# Patient Record
Sex: MALE | Race: BLACK OR AFRICAN AMERICAN | URBAN - METROPOLITAN AREA
[De-identification: names, ages, dates, MRNs, and addresses within clinical notes are randomized per-mention and may not be internally consistent; named-entity substitution may affect disease eponyms.]

---

## 2018-06-14 PROBLEM — I10 ESSENTIAL HYPERTENSION, BENIGN: Status: ACTIVE | Noted: 2018-06-14

## 2018-06-14 PROBLEM — Z00.00 ROUTINE GENERAL MEDICAL EXAMINATION AT A HEALTH CARE FACILITY: Status: ACTIVE | Noted: 2018-06-14

## 2018-06-14 PROBLEM — Z11.59 ENCOUNTER FOR HEPATITIS C SCREENING TEST FOR LOW RISK PATIENT: Status: ACTIVE | Noted: 2018-06-14

## 2018-06-29 PROBLEM — R73.9 HYPERGLYCEMIA: Status: ACTIVE | Noted: 2018-06-29

## 2018-09-17 PROBLEM — Z00.00 ROUTINE GENERAL MEDICAL EXAMINATION AT A HEALTH CARE FACILITY: Status: RESOLVED | Noted: 2018-06-14 | Resolved: 2018-09-17

## 2019-01-22 PROBLEM — E88.819 INSULIN RESISTANCE: Status: ACTIVE | Noted: 2019-01-22

## 2019-01-23 PROBLEM — E88.819 INSULIN RESISTANCE: Status: RESOLVED | Noted: 2019-01-22 | Resolved: 2019-01-23

## 2019-07-09 PROBLEM — E78.5 HYPERLIPIDEMIA: Status: ACTIVE | Noted: 2019-07-09

## 2019-10-14 PROBLEM — Z00.00 ANNUAL PHYSICAL EXAM: Status: RESOLVED | Noted: 2018-06-14 | Resolved: 2019-10-14

## 2021-05-30 ENCOUNTER — RECORDS - HEALTHEAST (OUTPATIENT)
Dept: ADMINISTRATIVE | Facility: CLINIC | Age: 66
End: 2021-05-30

## 2022-05-11 PROBLEM — Z11.59 ENCOUNTER FOR HEPATITIS C SCREENING TEST FOR LOW RISK PATIENT: Status: RESOLVED | Noted: 2018-06-14 | Resolved: 2022-05-11

## 2022-05-11 PROBLEM — R73.02 IGT (IMPAIRED GLUCOSE TOLERANCE): Status: ACTIVE | Noted: 2022-05-11

## 2023-10-13 ENCOUNTER — HOSPITAL ENCOUNTER (INPATIENT)
Facility: HOSPITAL | Age: 68
LOS: 5 days | Discharge: HOME | DRG: 200 | End: 2023-10-18
Attending: EMERGENCY MEDICINE | Admitting: INTERNAL MEDICINE
Payer: MEDICARE

## 2023-10-13 DIAGNOSIS — J93.83 SPONTANEOUS PNEUMOTHORAX: Primary | ICD-10-CM

## 2023-10-13 DIAGNOSIS — I25.10 CORONARY ARTERY CALCIFICATION SEEN ON CT SCAN: ICD-10-CM

## 2023-10-13 DIAGNOSIS — R07.9 CHEST PAIN: ICD-10-CM

## 2023-10-13 DIAGNOSIS — Q24.8 PERICARDIAL CYST: ICD-10-CM

## 2023-10-13 DIAGNOSIS — J93.9 PNEUMOTHORAX ON LEFT: ICD-10-CM

## 2023-10-13 PROBLEM — R73.03 PRE-DIABETES: Chronic | Status: ACTIVE | Noted: 2018-06-29

## 2023-10-13 LAB
ALBUMIN SERPL BCP-MCNC: 4.1 G/DL (ref 3.5–5.2)
ALP SERPL-CCNC: 65 U/L (ref 55–135)
ALT SERPL W/O P-5'-P-CCNC: 17 U/L (ref 10–44)
ANION GAP SERPL CALC-SCNC: 12 MMOL/L (ref 8–16)
AST SERPL-CCNC: 23 U/L (ref 10–40)
BASOPHILS # BLD AUTO: 0.05 K/UL (ref 0–0.2)
BASOPHILS NFR BLD: 0.5 % (ref 0–1.9)
BILIRUB SERPL-MCNC: 0.4 MG/DL (ref 0.1–1)
BNP SERPL-MCNC: <10 PG/ML (ref 0–99)
BUN SERPL-MCNC: 10 MG/DL (ref 8–23)
CALCIUM SERPL-MCNC: 9.2 MG/DL (ref 8.7–10.5)
CHLORIDE SERPL-SCNC: 104 MMOL/L (ref 95–110)
CO2 SERPL-SCNC: 22 MMOL/L (ref 23–29)
CREAT SERPL-MCNC: 1 MG/DL (ref 0.5–1.4)
DIFFERENTIAL METHOD: ABNORMAL
EOSINOPHIL # BLD AUTO: 0.5 K/UL (ref 0–0.5)
EOSINOPHIL NFR BLD: 4.8 % (ref 0–8)
ERYTHROCYTE [DISTWIDTH] IN BLOOD BY AUTOMATED COUNT: 13.5 % (ref 11.5–14.5)
EST. GFR  (NO RACE VARIABLE): >60 ML/MIN/1.73 M^2
GLUCOSE SERPL-MCNC: 107 MG/DL (ref 70–110)
HCT VFR BLD AUTO: 43.1 % (ref 40–54)
HCV AB SERPL QL IA: NEGATIVE
HEP C VIRUS HOLD SPECIMEN: NORMAL
HGB BLD-MCNC: 14.7 G/DL (ref 14–18)
HIV 1+2 AB+HIV1 P24 AG SERPL QL IA: NEGATIVE
IMM GRANULOCYTES # BLD AUTO: 0.02 K/UL (ref 0–0.04)
IMM GRANULOCYTES NFR BLD AUTO: 0.2 % (ref 0–0.5)
LYMPHOCYTES # BLD AUTO: 2 K/UL (ref 1–4.8)
LYMPHOCYTES NFR BLD: 21.3 % (ref 18–48)
MCH RBC QN AUTO: 32.3 PG (ref 27–31)
MCHC RBC AUTO-ENTMCNC: 34.1 G/DL (ref 32–36)
MCV RBC AUTO: 95 FL (ref 82–98)
MONOCYTES # BLD AUTO: 0.8 K/UL (ref 0.3–1)
MONOCYTES NFR BLD: 8.2 % (ref 4–15)
NEUTROPHILS # BLD AUTO: 6.1 K/UL (ref 1.8–7.7)
NEUTROPHILS NFR BLD: 65 % (ref 38–73)
NRBC BLD-RTO: 0 /100 WBC
PLATELET # BLD AUTO: 262 K/UL (ref 150–450)
PMV BLD AUTO: 8.6 FL (ref 9.2–12.9)
POTASSIUM SERPL-SCNC: 4 MMOL/L (ref 3.5–5.1)
PROT SERPL-MCNC: 7.8 G/DL (ref 6–8.4)
RBC # BLD AUTO: 4.55 M/UL (ref 4.6–6.2)
SODIUM SERPL-SCNC: 138 MMOL/L (ref 136–145)
TROPONIN I SERPL DL<=0.01 NG/ML-MCNC: 0.02 NG/ML (ref 0–0.03)
TROPONIN I SERPL DL<=0.01 NG/ML-MCNC: <0.006 NG/ML (ref 0–0.03)
WBC # BLD AUTO: 9.43 K/UL (ref 3.9–12.7)

## 2023-10-13 PROCEDURE — 63600175 PHARM REV CODE 636 W HCPCS: Performed by: EMERGENCY MEDICINE

## 2023-10-13 PROCEDURE — 25500020 PHARM REV CODE 255: Performed by: INTERNAL MEDICINE

## 2023-10-13 PROCEDURE — 93005 ELECTROCARDIOGRAM TRACING: CPT

## 2023-10-13 PROCEDURE — 32551 INSERTION OF CHEST TUBE: CPT | Mod: LT

## 2023-10-13 PROCEDURE — 87389 HIV-1 AG W/HIV-1&-2 AB AG IA: CPT | Performed by: EMERGENCY MEDICINE

## 2023-10-13 PROCEDURE — 80053 COMPREHEN METABOLIC PANEL: CPT | Performed by: PHYSICIAN ASSISTANT

## 2023-10-13 PROCEDURE — 84484 ASSAY OF TROPONIN QUANT: CPT | Mod: 91 | Performed by: INTERNAL MEDICINE

## 2023-10-13 PROCEDURE — 84484 ASSAY OF TROPONIN QUANT: CPT | Performed by: PHYSICIAN ASSISTANT

## 2023-10-13 PROCEDURE — 93010 ELECTROCARDIOGRAM REPORT: CPT | Mod: ,,, | Performed by: INTERNAL MEDICINE

## 2023-10-13 PROCEDURE — 25000003 PHARM REV CODE 250: Performed by: INTERNAL MEDICINE

## 2023-10-13 PROCEDURE — 99291 CRITICAL CARE FIRST HOUR: CPT

## 2023-10-13 PROCEDURE — 85025 COMPLETE CBC W/AUTO DIFF WBC: CPT | Performed by: PHYSICIAN ASSISTANT

## 2023-10-13 PROCEDURE — 93010 EKG 12-LEAD: ICD-10-PCS | Mod: ,,, | Performed by: INTERNAL MEDICINE

## 2023-10-13 PROCEDURE — 96374 THER/PROPH/DIAG INJ IV PUSH: CPT

## 2023-10-13 PROCEDURE — 21400001 HC TELEMETRY ROOM

## 2023-10-13 PROCEDURE — 86803 HEPATITIS C AB TEST: CPT | Performed by: EMERGENCY MEDICINE

## 2023-10-13 PROCEDURE — 83880 ASSAY OF NATRIURETIC PEPTIDE: CPT | Performed by: PHYSICIAN ASSISTANT

## 2023-10-13 RX ORDER — HYDRALAZINE HYDROCHLORIDE 20 MG/ML
10 INJECTION INTRAMUSCULAR; INTRAVENOUS EVERY 6 HOURS PRN
Status: DISCONTINUED | OUTPATIENT
Start: 2023-10-13 | End: 2023-10-18 | Stop reason: HOSPADM

## 2023-10-13 RX ORDER — TALC
6 POWDER (GRAM) TOPICAL NIGHTLY PRN
Status: DISCONTINUED | OUTPATIENT
Start: 2023-10-13 | End: 2023-10-18 | Stop reason: HOSPADM

## 2023-10-13 RX ORDER — ATORVASTATIN CALCIUM 10 MG/1
10 TABLET, FILM COATED ORAL DAILY
Status: DISCONTINUED | OUTPATIENT
Start: 2023-10-14 | End: 2023-10-18 | Stop reason: HOSPADM

## 2023-10-13 RX ORDER — IBUPROFEN 200 MG
24 TABLET ORAL
Status: DISCONTINUED | OUTPATIENT
Start: 2023-10-13 | End: 2023-10-18 | Stop reason: HOSPADM

## 2023-10-13 RX ORDER — GLUCAGON 1 MG
1 KIT INJECTION
Status: DISCONTINUED | OUTPATIENT
Start: 2023-10-13 | End: 2023-10-18 | Stop reason: HOSPADM

## 2023-10-13 RX ORDER — NALOXONE HCL 0.4 MG/ML
0.02 VIAL (ML) INJECTION
Status: DISCONTINUED | OUTPATIENT
Start: 2023-10-13 | End: 2023-10-18 | Stop reason: HOSPADM

## 2023-10-13 RX ORDER — AMOXICILLIN 250 MG
1 CAPSULE ORAL 2 TIMES DAILY
Status: DISCONTINUED | OUTPATIENT
Start: 2023-10-13 | End: 2023-10-18 | Stop reason: HOSPADM

## 2023-10-13 RX ORDER — IBUPROFEN 200 MG
16 TABLET ORAL
Status: DISCONTINUED | OUTPATIENT
Start: 2023-10-13 | End: 2023-10-18 | Stop reason: HOSPADM

## 2023-10-13 RX ORDER — IPRATROPIUM BROMIDE AND ALBUTEROL SULFATE 2.5; .5 MG/3ML; MG/3ML
3 SOLUTION RESPIRATORY (INHALATION) EVERY 6 HOURS PRN
Status: DISCONTINUED | OUTPATIENT
Start: 2023-10-13 | End: 2023-10-18 | Stop reason: HOSPADM

## 2023-10-13 RX ORDER — TRAMADOL HYDROCHLORIDE 50 MG/1
50 TABLET ORAL EVERY 6 HOURS PRN
Status: DISCONTINUED | OUTPATIENT
Start: 2023-10-13 | End: 2023-10-18 | Stop reason: HOSPADM

## 2023-10-13 RX ORDER — SODIUM CHLORIDE 0.9 % (FLUSH) 0.9 %
10 SYRINGE (ML) INJECTION EVERY 12 HOURS PRN
Status: DISCONTINUED | OUTPATIENT
Start: 2023-10-13 | End: 2023-10-18 | Stop reason: HOSPADM

## 2023-10-13 RX ORDER — VALSARTAN 160 MG/1
320 TABLET ORAL DAILY
Status: DISCONTINUED | OUTPATIENT
Start: 2023-10-14 | End: 2023-10-18 | Stop reason: HOSPADM

## 2023-10-13 RX ORDER — MORPHINE SULFATE 4 MG/ML
1 INJECTION, SOLUTION INTRAMUSCULAR; INTRAVENOUS EVERY 4 HOURS PRN
Status: DISCONTINUED | OUTPATIENT
Start: 2023-10-13 | End: 2023-10-17

## 2023-10-13 RX ORDER — MORPHINE SULFATE 4 MG/ML
4 INJECTION, SOLUTION INTRAMUSCULAR; INTRAVENOUS
Status: COMPLETED | OUTPATIENT
Start: 2023-10-13 | End: 2023-10-13

## 2023-10-13 RX ORDER — ACETAMINOPHEN 325 MG/1
650 TABLET ORAL EVERY 8 HOURS PRN
Status: DISCONTINUED | OUTPATIENT
Start: 2023-10-13 | End: 2023-10-18 | Stop reason: HOSPADM

## 2023-10-13 RX ORDER — ONDANSETRON 2 MG/ML
4 INJECTION INTRAMUSCULAR; INTRAVENOUS EVERY 6 HOURS PRN
Status: DISCONTINUED | OUTPATIENT
Start: 2023-10-13 | End: 2023-10-18 | Stop reason: HOSPADM

## 2023-10-13 RX ORDER — ACETAMINOPHEN 325 MG/1
650 TABLET ORAL EVERY 4 HOURS PRN
Status: DISCONTINUED | OUTPATIENT
Start: 2023-10-13 | End: 2023-10-18 | Stop reason: HOSPADM

## 2023-10-13 RX ORDER — POLYETHYLENE GLYCOL 3350 17 G/17G
17 POWDER, FOR SOLUTION ORAL DAILY PRN
Status: DISCONTINUED | OUTPATIENT
Start: 2023-10-13 | End: 2023-10-18 | Stop reason: HOSPADM

## 2023-10-13 RX ADMIN — IOHEXOL 100 ML: 350 INJECTION, SOLUTION INTRAVENOUS at 09:10

## 2023-10-13 RX ADMIN — TRAMADOL HYDROCHLORIDE 50 MG: 50 TABLET, COATED ORAL at 08:10

## 2023-10-13 RX ADMIN — Medication 6 MG: at 08:10

## 2023-10-13 RX ADMIN — SENNOSIDES AND DOCUSATE SODIUM 1 TABLET: 8.6; 5 TABLET ORAL at 08:10

## 2023-10-13 RX ADMIN — MORPHINE SULFATE 4 MG: 4 INJECTION INTRAVENOUS at 02:10

## 2023-10-13 NOTE — NURSING TRANSFER
Nursing Transfer Note      10/13/2023   5:43 PM    Nurse giving handoff: Brianna TODD  Nurse receiving handoff:Mireya TODD    Reason patient is being transferred: Continuous cardiac/O2 monitoring    Transfer To: 217    Transfer via stretcher    Transfer with chest tube and personal belongings    Transported by Brianna RN    Transfer Vital Signs:  Blood Pressure:141/94  Heart Rate:79  O2:95  Temperature:98.8  Respirations:20    Telemetry: Box Number [id number], Rate 79, and Rhythm NSR  Order for Tele Monitor? Yes    Additional Lines: Chest Tube left side to cont. Wall suction    4eyes on Skin: yes    Medicines sent: none    Any special needs or follow-up needed: CTS consult    Patient belongings transferred with patient: Yes    Chart send with patient: Yes    Notified: spouse, daughter    Patient reassessed at: 2413 10/13/23 (date, time)  1  Upon arrival to floor: cardiac monitor applied, patient oriented to room, call bell in reach, and bed in lowest position

## 2023-10-13 NOTE — FIRST PROVIDER EVALUATION
Medical screening examination initiated.  I have conducted a focused provider triage encounter, findings are as follows:    Brief history of present illness:  CP, sob, onset 8am today    Vitals:    10/13/23 1320   BP: 126/67   BP Location: Right arm   Patient Position: Sitting   Pulse: 95   Resp: 16   Temp: 99.1 °F (37.3 °C)   TempSrc: Oral   SpO2: 95%   Weight: 98.8 kg (217 lb 13 oz)       Pertinent physical exam:  nad, alert    Brief workup plan:  cardiac    Preliminary workup initiated; this workup will be continued and followed by the physician or advanced practice provider that is assigned to the patient when roomed.

## 2023-10-13 NOTE — SUBJECTIVE & OBJECTIVE
Past Medical History:   Diagnosis Date    Hyperlipidemia     Hypertension     Malignant tumor of prostate        Past Surgical History:   Procedure Laterality Date    PROSTATECTOMY         Review of patient's allergies indicates:  No Known Allergies    No current facility-administered medications on file prior to encounter.     Current Outpatient Medications on File Prior to Encounter   Medication Sig    atorvastatin (LIPITOR) 10 MG tablet Take 1 tablet by mouth once daily    clotrimazole-betamethasone 1-0.05% (LOTRISONE) cream APPLY DAB TO AFFECTED AREA DAILY AS NEEDED    JANUMET -1,000 mg TM24 Take 1 tablet by mouth once daily    meloxicam (MOBIC) 15 MG tablet Take 1 tablet (15 mg total) by mouth once daily.    olmesartan (BENICAR) 40 MG tablet Take 1 tablet by mouth once daily    tadalafiL (CIALIS) 20 MG Tab TAKE 1 TABLET BY MOUTH EVERY OTHER DAY AS NEEDED FOR ERECTILE DYSFUNCTION    traMADoL (ULTRAM) 50 mg tablet Take 50 mg by mouth every 4 to 6 hours as needed for Pain.     Family History       Problem Relation (Age of Onset)    Hypertension Mother          Tobacco Use    Smoking status: Never    Smokeless tobacco: Never   Substance and Sexual Activity    Alcohol use: No    Drug use: No    Sexual activity: Not Currently     Review of Systems   Constitutional:  Positive for activity change and fatigue. Negative for chills and fever.   HENT:  Negative for congestion, postnasal drip, rhinorrhea, sneezing and sore throat.    Eyes:  Negative for visual disturbance.   Respiratory:  Positive for cough (dry) and shortness of breath. Negative for chest tightness and wheezing.    Cardiovascular:  Positive for chest pain. Negative for palpitations and leg swelling.   Gastrointestinal:  Negative for abdominal distention, abdominal pain, blood in stool, constipation, diarrhea, nausea and vomiting.   Genitourinary:  Negative for difficulty urinating, hematuria and urgency.   Musculoskeletal:         R shoulder labrum  injury     Skin:  Negative for rash and wound.   Allergic/Immunologic: Negative.    Neurological:  Negative for dizziness, syncope, weakness, light-headedness and headaches.   Hematological: Negative.    Psychiatric/Behavioral: Negative.       Objective:     Vital Signs (Most Recent):  Temp: 98.8 °F (37.1 °C) (10/13/23 1545)  Pulse: 72 (10/13/23 1545)  Resp: 18 (10/13/23 1545)  BP: (!) 140/75 (10/13/23 1545)  SpO2: 98 % (10/13/23 1545) Vital Signs (24h Range):  Temp:  [98.8 °F (37.1 °C)-99.1 °F (37.3 °C)] 98.8 °F (37.1 °C)  Pulse:  [72-95] 72  Resp:  [16-20] 18  SpO2:  [95 %-98 %] 98 %  BP: (126-161)/(67-82) 140/75     Weight: 98.8 kg (217 lb 13 oz)  Body mass index is 31.25 kg/m².     Physical Exam  Vitals and nursing note reviewed.   Constitutional:       General: He is not in acute distress.     Appearance: He is obese. He is not ill-appearing.   HENT:      Head: Normocephalic and atraumatic.      Mouth/Throat:      Mouth: Mucous membranes are moist.      Pharynx: Oropharynx is clear.   Eyes:      General: No scleral icterus.     Extraocular Movements: Extraocular movements intact.      Conjunctiva/sclera: Conjunctivae normal.   Cardiovascular:      Rate and Rhythm: Normal rate and regular rhythm.      Heart sounds: Normal heart sounds. No murmur heard.     No friction rub. No gallop.   Pulmonary:      Effort: Pulmonary effort is normal.      Breath sounds: Normal breath sounds. No wheezing, rhonchi or rales.      Comments: Sats in mid 90s on room air, no increased work of breathing   Abdominal:      General: Bowel sounds are normal. There is no distension.      Palpations: Abdomen is soft.      Tenderness: There is no abdominal tenderness. There is no guarding or rebound.   Genitourinary:     Comments: Deferred  Musculoskeletal:      Right lower leg: No edema.      Left lower leg: No edema.   Skin:     General: Skin is warm and dry.      Findings: No rash.      Comments: L sided chest tube in place to wall  suction    Neurological:      General: No focal deficit present.      Mental Status: He is alert and oriented to person, place, and time. Mental status is at baseline.   Psychiatric:         Mood and Affect: Mood normal.         Behavior: Behavior normal.                Significant Labs: All pertinent labs within the past 24 hours have been reviewed.    Significant Imaging: I have reviewed all pertinent imaging results/findings within the past 24 hours.

## 2023-10-13 NOTE — PLAN OF CARE
Patient arrived to room 217. Left chest tube in place, CDI, on continuous wall suction. VSS, on RA. Cardiac and cont. Pulse ox applied. Chest tube taped to floor, emergency equipment at bedside. No c/o, family at bed side. NSR on tele box #[id number].      Problem: Adult Inpatient Plan of Care  Goal: Plan of Care Review  Outcome: Ongoing, Progressing

## 2023-10-13 NOTE — ED NOTES
"Pt's chest tube has Petrillium gauze held in place by secondary dressing of sterile 4" x 4" gauze secured by tape on 3 sides. Chest tube is hooked up to low, continuous suction.   "

## 2023-10-13 NOTE — PHARMACY MED REC
"Admission Medication History     The home medication history was taken by Prudencio Harrell.    You may go to "Admission" then "Reconcile Home Medications" tabs to review and/or act upon these items.     The home medication list has been updated by the Pharmacy department.   Please read ALL comments highlighted in yellow.   Please address this information as you see fit.    Feel free to contact us if you have any questions or require assistance.      Medications listed below were obtained from: [employer]- [employer] and Medical records  (Not in a hospital admission)        Prudencio Harrell  [id number]      Current Outpatient Medications on File Prior to Encounter   Medication Sig Dispense Refill Last Dose    atorvastatin (LIPITOR) 10 MG tablet Take 1 tablet by mouth once daily 90 tablet 0     clotrimazole-betamethasone 1-0.05% (LOTRISONE) cream APPLY DAB TO AFFECTED AREA DAILY AS NEEDED       JANUMET -1,000 mg TM24 Take 1 tablet by mouth once daily 90 tablet 0     meloxicam (MOBIC) 15 MG tablet Take 1 tablet (15 mg total) by mouth once daily. 30 tablet 1     olmesartan (BENICAR) 40 MG tablet Take 1 tablet by mouth once daily 90 tablet 0     tadalafiL (CIALIS) 20 MG Tab TAKE 1 TABLET BY MOUTH EVERY OTHER DAY AS NEEDED FOR ERECTILE DYSFUNCTION       traMADoL (ULTRAM) 50 mg tablet Take 50 mg by mouth every 4 to 6 hours as needed for Pain.                              .          "

## 2023-10-13 NOTE — ED NOTES
Dr. Fairchild at bedside prepping for chest tube insertion at this time. Consent form signed and placed in pt's chart

## 2023-10-13 NOTE — ED PROVIDER NOTES
SCRIBE #1 NOTE: I, Nereyda Chavez, am scribing for, and in the presence of, Mariola Caldwell MD. I have scribed the entire note.       History     Chief Complaint   Patient presents with    Chest Pain     Pt states hes been having chest pain that started about 0800 this morning. Pt describes his pain as a sharp pain in his left chest that does not radiate anywhere.     Review of patient's allergies indicates:  No Known Allergies      History of Present Illness     HPI    10/13/2023, 2:07 PM  History obtained from the patient      History of Present Illness: Brayan Mills is a 68 y.o. male patient with no cardio or pulmonary history who presents to the Emergency Department for evaluation of chest pain which onset when he woke up around 8 this am. Pt describes a sharp pain in the left side of his chest that does not radiate. Symptoms are constant and moderate in severity. No mitigating or exacerbating factors reported. Associated sxs include SOB. Patient denies any fever, chills, abd pain, cough, back pain, and all other sxs at this time. No prior tx reported. No further complaints or concerns at this time.       Arrival mode: Personal vehicle      PCP: Maria Ines Bingham MD        Past Medical History:  Past Medical History:   Diagnosis Date    Hyperlipidemia     Hypertension     Malignant tumor of prostate        Past Surgical History:  Past Surgical History:   Procedure Laterality Date    PROSTATECTOMY           Family History:  Family History   Problem Relation Age of Onset    Hypertension Mother        Social History:  Social History     Tobacco Use    Smoking status: Never    Smokeless tobacco: Never   Substance and Sexual Activity    Alcohol use: No    Drug use: No    Sexual activity: Not Currently        Review of Systems     Review of Systems   Constitutional:  Negative for chills and fever.   HENT:  Negative for ear discharge and ear pain.    Eyes:  Negative for pain and redness.   Respiratory:   Positive for shortness of breath. Negative for cough.    Cardiovascular:  Positive for chest pain.   Gastrointestinal:  Negative for abdominal pain and nausea.   Genitourinary:  Negative for dysuria and hematuria.   Musculoskeletal:  Negative for back pain and neck pain.   Skin:  Negative for rash and wound.   Neurological:  Negative for weakness and numbness.   Psychiatric/Behavioral:  Negative for agitation and confusion.    All other systems reviewed and are negative.       Physical Exam     Initial Vitals [10/13/23 1320]   BP Pulse Resp Temp SpO2   126/67 95 16 99.1 °F (37.3 °C) 95 %      MAP       --          Physical Exam  Nursing Notes and Vital Signs Reviewed.  Constitutional: Patient is in no acute distress. Well-developed and well-nourished.  Head: Atraumatic. Normocephalic.  Eyes: PERRL. EOM intact. Conjunctivae are not pale. No scleral icterus.  ENT: Mucous membranes are moist. Oropharynx is clear and symmetric.    Neck: Supple. Full ROM. No lymphadenopathy.  Cardiovascular: Regular rate. Regular rhythm. No murmurs, rubs, or gallops. Distal pulses are 2+ and symmetric.  Pulmonary/Chest: Decreased breath sounds on the left side. No wheezing or rales.  Abdominal: Soft and non-distended.  There is no tenderness.  No rebound, guarding, or rigidity.  Genitourinary: No CVA tenderness  Musculoskeletal: Moves all extremities. No obvious deformities. No edema. No calf tenderness.  Skin: Warm and dry.  Neurological:  Alert, awake, and appropriate.  Normal speech.  No acute focal neurological deficits are appreciated.  Psychiatric: Normal affect. Good eye contact. Appropriate in content.     ED Course   Critical Care    Date/Time: 10/13/2023 2:18 PM    Performed by: Stephon Fairchild Jr., MD  Authorized by: Stephon Fairchild Jr., MD  Direct patient critical care time: 12 minutes  Additional history critical care time: 9 minutes  Ordering / reviewing critical care time: 8 minutes  Documentation critical care time: 7  minutes  Consulting other physicians critical care time: 6 minutes  Consult with family critical care time: 5 minutes  Total critical care time (exclusive of procedural time) : 47 minutes  Critical care time was exclusive of separately billable procedures and treating other patients and teaching time.  Critical care was necessary to treat or prevent imminent or life-threatening deterioration of the following conditions: left lung collapse.  Critical care was time spent personally by me on the following activities: blood draw for specimens, development of treatment plan with patient or surrogate, discussions with consultants, evaluation of patient's response to treatment, examination of patient, obtaining history from patient or surrogate, ordering and performing treatments and interventions, ordering and review of laboratory studies, ordering and review of radiographic studies, pulse oximetry, re-evaluation of patient's condition and review of old charts.      Chest Tube    Date/Time: 10/13/2023 2:44 PM  Location procedure was performed: [facility] EMERGENCY DEPARTMENT    Performed by: Stephon Fairchild Jr., MD  Authorized by: Stephon Fairchild Jr., MD  Consent Done: Yes  Consent: Verbal consent obtained.  Indications: pneumothorax    Patient sedated: no  Anesthesia: local infiltration    Anesthesia:  Local Anesthetic: lidocaine 1% without epinephrine  Preparation: skin prepped with Betadine and sterile field established  Placement location: left lateral  Scalpel size: 11  Tube size: 8 Trinidadian  Dissection instrument: finger  Ultrasound guidance: no  Tension pneumothorax heard: no  Tube connected to: suction  Drainage characteristics: air.  Suture material: 0 silk  Dressing: 4x4 sterile gauze and Xeroform gauze  Patient tolerance: Patient tolerated the procedure well with no immediate complications        ED Vital Signs:  Vitals:    10/13/23 1418 10/13/23 1500 10/13/23 1545 10/13/23 1733   BP: 128/75 132/70 (!) 140/75 (!) 141/94  "  Pulse: 89 77 72 79   Resp: 16 20 18 20   Temp:   98.8 °F (37.1 °C) 98.8 °F (37.1 °C)   TempSrc:   Oral Oral   SpO2: 96% 97% 98% 95%   Weight:    94.6 kg (208 lb 8.9 oz)   Height:    5' 10" (1.778 m)    10/13/23 2004 10/13/23 2017 10/13/23 2057 10/14/23 0000   BP: (!) 115/59   126/63   Pulse: 79 81  68   Resp: 18 20 18 18   Temp: 97.2 °F (36.2 °C)   97.9 °F (36.6 °C)   TempSrc: Oral   Oral   SpO2: (!) 94% (!) 94%  95%   Weight:    95 kg (209 lb 7 oz)   Height:        10/14/23 0404 10/14/23 0738 10/14/23 0825 10/14/23 0844   BP: 132/76 134/64     Pulse: 69 72     Resp: 18 18     Temp: 98.4 °F (36.9 °C) 98.8 °F (37.1 °C)     TempSrc: Oral      SpO2: (!) 94% (!) 93% (!) 94% (!) 94%   Weight:       Height:        10/14/23 0930 10/14/23 1102 10/14/23 1112   BP:   119/62   Pulse: 105 72 81   Resp:   18   Temp:   99.1 °F (37.3 °C)   TempSrc:      SpO2:   (!) 93%   Weight:      Height:          Abnormal Lab Results:  Labs Reviewed   CBC W/ AUTO DIFFERENTIAL - Abnormal; Notable for the following components:       Result Value    RBC 4.55 (*)     MCH 32.3 (*)     MPV 8.6 (*)     All other components within normal limits    Narrative:     Release to patient->Immediate   COMPREHENSIVE METABOLIC PANEL - Abnormal; Notable for the following components:    CO2 22 (*)     All other components within normal limits    Narrative:     Release to patient->Immediate   TROPONIN I    Narrative:     Release to patient->Immediate   B-TYPE NATRIURETIC PEPTIDE    Narrative:     Release to patient->Immediate   HIV 1 / 2 ANTIBODY    Narrative:     Release to patient->Immediate   HEPATITIS C ANTIBODY    Narrative:     Release to patient->Immediate   HEP C VIRUS HOLD SPECIMEN    Narrative:     Release to patient->Immediate   TROPONIN I        All Lab Results:  Results for orders placed or performed during the hospital encounter of 10/13/23   CBC auto differential   Result Value Ref Range    WBC 9.43 3.90 - 12.70 K/uL    RBC 4.55 (L) 4.60 - 6.20 " M/uL    Hemoglobin 14.7 14.0 - 18.0 g/dL    Hematocrit 43.1 40.0 - 54.0 %    MCV 95 82 - 98 fL    MCH 32.3 (H) 27.0 - 31.0 pg    MCHC 34.1 32.0 - 36.0 g/dL    RDW 13.5 11.5 - 14.5 %    Platelets 262 150 - 450 K/uL    MPV 8.6 (L) 9.2 - 12.9 fL    Immature Granulocytes 0.2 0.0 - 0.5 %    Gran # (ANC) 6.1 1.8 - 7.7 K/uL    Immature Grans (Abs) 0.02 0.00 - 0.04 K/uL    Lymph # 2.0 1.0 - 4.8 K/uL    Mono # 0.8 0.3 - 1.0 K/uL    Eos # 0.5 0.0 - 0.5 K/uL    Baso # 0.05 0.00 - 0.20 K/uL    nRBC 0 0 /100 WBC    Gran % 65.0 38.0 - 73.0 %    Lymph % 21.3 18.0 - 48.0 %    Mono % 8.2 4.0 - 15.0 %    Eosinophil % 4.8 0.0 - 8.0 %    Basophil % 0.5 0.0 - 1.9 %    Differential Method Automated    Comprehensive metabolic panel   Result Value Ref Range    Sodium 138 136 - 145 mmol/L    Potassium 4.0 3.5 - 5.1 mmol/L    Chloride 104 95 - 110 mmol/L    CO2 22 (L) 23 - 29 mmol/L    Glucose 107 70 - 110 mg/dL    BUN 10 8 - 23 mg/dL    Creatinine 1.0 0.5 - 1.4 mg/dL    Calcium 9.2 8.7 - 10.5 mg/dL    Total Protein 7.8 6.0 - 8.4 g/dL    Albumin 4.1 3.5 - 5.2 g/dL    Total Bilirubin 0.4 0.1 - 1.0 mg/dL    Alkaline Phosphatase 65 55 - 135 U/L    AST 23 10 - 40 U/L    ALT 17 10 - 44 U/L    eGFR >60 >60 mL/min/1.73 m^2    Anion Gap 12 8 - 16 mmol/L   Troponin I #1   Result Value Ref Range    Troponin I 0.019 0.000 - 0.026 ng/mL   BNP   Result Value Ref Range    BNP <10 0 - 99 pg/mL   HIV 1/2 Ag/Ab (4th Gen)   Result Value Ref Range    HIV 1/2 Ag/Ab Negative Negative   Hepatitis C Antibody   Result Value Ref Range    Hepatitis C Ab Negative Negative   HCV Virus Hold Specimen   Result Value Ref Range    HEP C Virus Hold Specimen Hold for HCV sendout    Troponin I #2   Result Value Ref Range    Troponin I <0.006 0.000 - 0.026 ng/mL   Basic Metabolic Panel (BMP)   Result Value Ref Range    Sodium 139 136 - 145 mmol/L    Potassium 4.2 3.5 - 5.1 mmol/L    Chloride 103 95 - 110 mmol/L    CO2 27 23 - 29 mmol/L    Glucose 89 70 - 110 mg/dL    BUN 12 8 -  23 mg/dL    Creatinine 0.9 0.5 - 1.4 mg/dL    Calcium 8.9 8.7 - 10.5 mg/dL    Anion Gap 9 8 - 16 mmol/L    eGFR >60 >60 mL/min/1.73 m^2   CBC with Automated Differential   Result Value Ref Range    WBC 6.49 3.90 - 12.70 K/uL    RBC 4.11 (L) 4.60 - 6.20 M/uL    Hemoglobin 13.3 (L) 14.0 - 18.0 g/dL    Hematocrit 39.3 (L) 40.0 - 54.0 %    MCV 96 82 - 98 fL    MCH 32.4 (H) 27.0 - 31.0 pg    MCHC 33.8 32.0 - 36.0 g/dL    RDW 13.4 11.5 - 14.5 %    Platelets 243 150 - 450 K/uL    MPV 8.9 (L) 9.2 - 12.9 fL    Immature Granulocytes 0.3 0.0 - 0.5 %    Gran # (ANC) 4.0 1.8 - 7.7 K/uL    Immature Grans (Abs) 0.02 0.00 - 0.04 K/uL    Lymph # 1.3 1.0 - 4.8 K/uL    Mono # 0.6 0.3 - 1.0 K/uL    Eos # 0.5 0.0 - 0.5 K/uL    Baso # 0.03 0.00 - 0.20 K/uL    nRBC 0 0 /100 WBC    Gran % 62.3 38.0 - 73.0 %    Lymph % 20.3 18.0 - 48.0 %    Mono % 9.2 4.0 - 15.0 %    Eosinophil % 7.4 0.0 - 8.0 %    Basophil % 0.5 0.0 - 1.9 %    Differential Method Automated          Imaging Results:  Imaging Results              X-Ray Chest 1 View (Final result)  Result time 10/13/23 15:07:17      Final result by Teresa Jimenez MD (Timothy) (10/13/23 15:07:17)                   Impression:      Near complete re-expansion of left pneumothorax.  Residual atelectasis suspected at the left base.      Electronically signed by: Teresa Jimenez MD  Date:    10/13/2023  Time:    15:07               Narrative:    EXAMINATION:  XR CHEST 1 VIEW    CLINICAL HISTORY:  Pneumothorax,    COMPARISON:  Chest, 10/13/2023.    FINDINGS:  Heart is normal in size.  There is been near complete re-expansion of the left lung.  There appears to be residual atelectasis at the left base.                                       X-Ray Chest AP Portable (Final result)  Result time 10/13/23 13:40:14      Final result by Cricket Guillermo MD (10/13/23 13:40:14)                   Impression:      Left tension pneumothorax.    Critical findings communicated to Nilton Sunshine NP by telephone on  October 13, 2023 at 1338.      Electronically signed by: Cricket Guillermo  Date:    10/13/2023  Time:    13:40               Narrative:    EXAMINATION:  XR CHEST AP PORTABLE    CLINICAL HISTORY:  Chest Pain;    FINDINGS:  Large left pneumothorax with rightward deviation of the mediastinum.  Right lung is clear.  No pneumothorax or pleural effusion.  No acute osseous finding.                                       The EKG was ordered, reviewed, and independently interpreted by the ED provider.  Interpretation time: 13:20  Rate: 99 BPM  Rhythm: normal sinus rhythm  Interpretation: Normal ECG. No STEMI.             The Emergency Provider reviewed the vital signs and test results, which are outlined above.     ED Discussion       3:07 PM: Discussed pt's case with Erma Carrera DO (General Surgery) who recommends consulting CT surgery because she does not manage chest tubes.    3:12 PM: Discussed case with Chung Knight MD (Cardiothoracic Surgery). Dr. Knight agrees with current care and management of pt and agrees to see pt in consult.     4:15 PM: Discussed case with Mariola Caldwell MD (Hospital Medicine). Dr. Caldwell agrees with current care and management of pt and accepts admission.   Admitting Service: Hospital medicine  Admitting Physician: Dr. Caldwell  Admit to: Obs med/ tele    4:16 PM: Re-evaluated pt. I have discussed test results, shared treatment plan, and the need for admission with patient and family at bedside. Pt and family express understanding at this time and agree with all information. All questions answered. Pt and family have no further questions or concerns at this time. Pt is ready for admit.             Medical Decision Making  Amount and/or Complexity of Data Reviewed  Labs: ordered. Decision-making details documented in ED Course.  Radiology: ordered. Decision-making details documented in ED Course.  ECG/medicine tests: ordered. Decision-making details documented in ED  Course.    Risk  Prescription drug management.  Decision regarding hospitalization.                ED Medication(s):  Medications   atorvastatin tablet 10 mg (10 mg Oral Given 10/14/23 0917)   valsartan tablet 320 mg (320 mg Oral Given 10/14/23 0917)   traMADoL tablet 50 mg (50 mg Oral Given 10/13/23 2057)   sodium chloride 0.9% flush 10 mL (has no administration in time range)   naloxone 0.4 mg/mL injection 0.02 mg (has no administration in time range)   glucose chewable tablet 16 g (has no administration in time range)   glucose chewable tablet 24 g (has no administration in time range)   glucagon (human recombinant) injection 1 mg (has no administration in time range)   acetaminophen tablet 650 mg (has no administration in time range)   morphine injection 1 mg (has no administration in time range)   senna-docusate 8.6-50 mg per tablet 1 tablet (1 tablet Oral Given 10/14/23 0917)   polyethylene glycol packet 17 g (has no administration in time range)   ondansetron injection 4 mg (has no administration in time range)   albuterol-ipratropium 2.5 mg-0.5 mg/3 mL nebulizer solution 3 mL (has no administration in time range)   acetaminophen tablet 650 mg (has no administration in time range)   melatonin tablet 6 mg (6 mg Oral Given 10/13/23 2056)   dextrose 10% bolus 125 mL 125 mL (has no administration in time range)   dextrose 10% bolus 250 mL 250 mL (has no administration in time range)   hydrALAZINE injection 10 mg (has no administration in time range)   morphine injection 4 mg (4 mg Intravenous Given 10/13/23 1418)   iohexoL (OMNIPAQUE 350) injection 100 mL (100 mLs Intravenous Given 10/13/23 2138)       Current Discharge Medication List                  Scribe Attestation:   Scribe #1: I performed the above scribed service and the documentation accurately describes the services I performed. I attest to the accuracy of the note.     Attending:   Physician Attestation Statement for Scribe #1: Paulette GILBERT Riddhi S,  MD, personally performed the services described in this documentation, as scribed by Nereyda Chavez, in my presence, and it is both accurate and complete.           Clinical Impression       ICD-10-CM ICD-9-CM   1. Chest pain  R07.9 786.50   2. Pneumothorax on left  J93.9 512.89   3. Spontaneous pneumothorax  J93.83 512.89       Disposition:   Disposition: Placed in Observation  Condition: Stephon Leyva Jr., MD  10/14/23 1305

## 2023-10-13 NOTE — H&P
[facility]  [facility]  History & Physical    Patient Name: Brayan Mills  MRN: [id number]  Patient Class: IP- Inpatient  Admission Date: 10/13/2023  Attending Physician: Mariola Caldwell MD  Primary Care Provider: Maria Ines Bingham MD         Patient information was obtained from patient, relative(s) and ER records.     Subjective:     Principal Problem:Spontaneous pneumothorax    Chief Complaint:   Chief Complaint   Patient presents with    Chest Pain     Pt states hes been having chest pain that started about 0800 this morning. Pt describes his pain as a sharp pain in his left chest that does not radiate anywhere.        HPI: Pt is a 67 YO  male with PMH notable for essential HTN, HLD, pre-DM, prostate ca (s/p prostatectomy) who presented to the ED on 10/13/23 for evaluation of L sided chest pain and shortness of breath. Pt reports he was in his usual state of health until approx 7 am on morning of admission when he began feeling fatigued, short of breath and had vague left sided chest discomfort. Pt reports he took some Advil which did not help the pain. Went to physical therapy for this shoulder but became fatigued and short of breath with activity which was unusual for him. Denies any prior similar episodes. Reports associated intermittent dry cough. Per patient's daughter, his wife noticed dry cough a few days ago but more pronounced this AM. In the ED, initial VS: temp 99.1, HR 95, RR 16, /67, sats 95% on RA. Work up notable for CXR with L tension pneumothorax. Labs fairly unremarkable. EKG NSR, trop and BNP negative. L sided chest tube was placed by the ED provider, CT surgery Dr. Knight consulted for chest tube management. Subsequent CXR showed near resolution of L PTX. Hospital medicine consulted for admission.     Pt reports he is very active at baseline. Denies any recent illness, trauma, falls, straining or injuries. He is not a smoker, denies any  prior hx of COPD/asthma/emphysema. Reports CP and dyspnea improved since placement of chest tube.       Past Medical History:   Diagnosis Date    Hyperlipidemia     Hypertension     Malignant tumor of prostate        Past Surgical History:   Procedure Laterality Date    PROSTATECTOMY         Review of patient's allergies indicates:  No Known Allergies    No current facility-administered medications on file prior to encounter.     Current Outpatient Medications on File Prior to Encounter   Medication Sig    atorvastatin (LIPITOR) 10 MG tablet Take 1 tablet by mouth once daily    clotrimazole-betamethasone 1-0.05% (LOTRISONE) cream APPLY DAB TO AFFECTED AREA DAILY AS NEEDED    JANUMET -1,000 mg TM24 Take 1 tablet by mouth once daily    meloxicam (MOBIC) 15 MG tablet Take 1 tablet (15 mg total) by mouth once daily.    olmesartan (BENICAR) 40 MG tablet Take 1 tablet by mouth once daily    tadalafiL (CIALIS) 20 MG Tab TAKE 1 TABLET BY MOUTH EVERY OTHER DAY AS NEEDED FOR ERECTILE DYSFUNCTION    traMADoL (ULTRAM) 50 mg tablet Take 50 mg by mouth every 4 to 6 hours as needed for Pain.     Family History       Problem Relation (Age of Onset)    Hypertension Mother          Tobacco Use    Smoking status: Never    Smokeless tobacco: Never   Substance and Sexual Activity    Alcohol use: No    Drug use: No    Sexual activity: Not Currently     Review of Systems   Constitutional:  Positive for activity change and fatigue. Negative for chills and fever.   HENT:  Negative for congestion, postnasal drip, rhinorrhea, sneezing and sore throat.    Eyes:  Negative for visual disturbance.   Respiratory:  Positive for cough (dry) and shortness of breath. Negative for chest tightness and wheezing.    Cardiovascular:  Positive for chest pain. Negative for palpitations and leg swelling.   Gastrointestinal:  Negative for abdominal distention, abdominal pain, blood in stool, constipation, diarrhea, nausea and vomiting.    Genitourinary:  Negative for difficulty urinating, hematuria and urgency.   Musculoskeletal:         R shoulder labrum injury     Skin:  Negative for rash and wound.   Allergic/Immunologic: Negative.    Neurological:  Negative for dizziness, syncope, weakness, light-headedness and headaches.   Hematological: Negative.    Psychiatric/Behavioral: Negative.       Objective:     Vital Signs (Most Recent):  Temp: 98.8 °F (37.1 °C) (10/13/23 1545)  Pulse: 72 (10/13/23 1545)  Resp: 18 (10/13/23 1545)  BP: (!) 140/75 (10/13/23 1545)  SpO2: 98 % (10/13/23 1545) Vital Signs (24h Range):  Temp:  [98.8 °F (37.1 °C)-99.1 °F (37.3 °C)] 98.8 °F (37.1 °C)  Pulse:  [72-95] 72  Resp:  [16-20] 18  SpO2:  [95 %-98 %] 98 %  BP: (126-161)/(67-82) 140/75     Weight: 98.8 kg (217 lb 13 oz)  Body mass index is 31.25 kg/m².     Physical Exam  Vitals and nursing note reviewed.   Constitutional:       General: He is not in acute distress.     Appearance: He is obese. He is not ill-appearing.   HENT:      Head: Normocephalic and atraumatic.      Mouth/Throat:      Mouth: Mucous membranes are moist.      Pharynx: Oropharynx is clear.   Eyes:      General: No scleral icterus.     Extraocular Movements: Extraocular movements intact.      Conjunctiva/sclera: Conjunctivae normal.   Cardiovascular:      Rate and Rhythm: Normal rate and regular rhythm.      Heart sounds: Normal heart sounds. No murmur heard.     No friction rub. No gallop.   Pulmonary:      Effort: Pulmonary effort is normal.      Breath sounds: Normal breath sounds. No wheezing, rhonchi or rales.      Comments: Sats in mid 90s on room air, no increased work of breathing   Abdominal:      General: Bowel sounds are normal. There is no distension.      Palpations: Abdomen is soft.      Tenderness: There is no abdominal tenderness. There is no guarding or rebound.   Genitourinary:     Comments: Deferred  Musculoskeletal:      Right lower leg: No edema.      Left lower leg: No edema.    Skin:     General: Skin is warm and dry.      Findings: No rash.      Comments: L sided chest tube in place to wall suction    Neurological:      General: No focal deficit present.      Mental Status: He is alert and oriented to person, place, and time. Mental status is at baseline.   Psychiatric:         Mood and Affect: Mood normal.         Behavior: Behavior normal.                Significant Labs: All pertinent labs within the past 24 hours have been reviewed.    Significant Imaging: I have reviewed all pertinent imaging results/findings within the past 24 hours.    Assessment/Plan:     * Spontaneous pneumothorax  - unclear etiology. Pt is a non-smoker, no COPD/emphysema, no recent trauma/injuries.   - s/p L chest tube placement in the ED, currently to wall suction   - CT surgery consulted; defer management   - will discuss with Dr. Knight re: need for CT imaging   - Monitor continuous pulse ox   - Monitor daily CXR   - Pain control with PO tramadol and IV morphine low dose as needed for breakthrough pain       Essential hypertension, benign  - continue home ARB  - monitor BP   - IV hydralazine as needed       Hyperlipidemia  - continue home statin       Pre-diabetes  - hold home Janumet  - monitor BG with BMP   - hypoglycemia protocol         VTE Risk Mitigation (From admission, onward)         Ordered     Reason for No Pharmacological VTE Prophylaxis  Once        Question:  Reasons:  Answer:  Physician Provided (leave comment)  Comment:  possible procedural intervention    10/13/23 1620     IP VTE HIGH RISK PATIENT  Once         10/13/23 1620     Place sequential compression device  Until discontinued         10/13/23 1620                 Mariola Caldwell MD  Department of Hospital Medicine  '[place] - Emergency Dept.

## 2023-10-13 NOTE — ASSESSMENT & PLAN NOTE
- unclear etiology. Pt is a non-smoker, no COPD/emphysema, no recent trauma/injuries.   - s/p L chest tube placement in the ED, currently to wall suction   - CT surgery consulted; defer management   - will discuss with Dr. Knight re: need for CT imaging   - Monitor continuous pulse ox   - Monitor daily CXR   - Pain control with PO tramadol and IV morphine low dose as needed for breakthrough pain

## 2023-10-13 NOTE — HPI
Pt is a 69 YO  male with PMH notable for essential HTN, HLD, pre-DM, prostate ca (s/p prostatectomy) who presented to the ED on 10/13/23 for evaluation of L sided chest pain and shortness of breath. Pt reports he was in his usual state of health until approx 7 am on morning of admission when he began feeling fatigued, short of breath and had vague left sided chest discomfort. Pt reports he took some Advil which did not help the pain. Went to physical therapy for this shoulder but became fatigued and short of breath with activity which was unusual for him. Denies any prior similar episodes. Reports associated intermittent dry cough. Per patient's daughter, his wife noticed dry cough a few days ago but more pronounced this AM. In the ED, initial VS: temp 99.1, HR 95, RR 16, /67, sats 95% on RA. Work up notable for CXR with L tension pneumothorax. Labs fairly unremarkable. EKG NSR, trop and BNP negative. L sided chest tube was placed by the ED provider, CT surgery Dr. Knight consulted for chest tube management. Subsequent CXR showed near resolution of L PTX. Hospital medicine consulted for admission.     Pt reports he is very active at baseline. Denies any recent illness, trauma, falls, straining or injuries. He is not a smoker, denies any prior hx of COPD/asthma/emphysema. Reports CP and dyspnea improved since placement of chest tube.

## 2023-10-14 PROBLEM — Q24.8 PERICARDIAL CYST: Status: ACTIVE | Noted: 2023-10-14

## 2023-10-14 PROBLEM — I25.10 CORONARY ARTERY CALCIFICATION SEEN ON CT SCAN: Status: ACTIVE | Noted: 2023-10-14

## 2023-10-14 LAB
ANION GAP SERPL CALC-SCNC: 9 MMOL/L (ref 8–16)
BASOPHILS # BLD AUTO: 0.03 K/UL (ref 0–0.2)
BASOPHILS NFR BLD: 0.5 % (ref 0–1.9)
BUN SERPL-MCNC: 12 MG/DL (ref 8–23)
CALCIUM SERPL-MCNC: 8.9 MG/DL (ref 8.7–10.5)
CHLORIDE SERPL-SCNC: 103 MMOL/L (ref 95–110)
CO2 SERPL-SCNC: 27 MMOL/L (ref 23–29)
CREAT SERPL-MCNC: 0.9 MG/DL (ref 0.5–1.4)
DIFFERENTIAL METHOD: ABNORMAL
EOSINOPHIL # BLD AUTO: 0.5 K/UL (ref 0–0.5)
EOSINOPHIL NFR BLD: 7.4 % (ref 0–8)
ERYTHROCYTE [DISTWIDTH] IN BLOOD BY AUTOMATED COUNT: 13.4 % (ref 11.5–14.5)
EST. GFR  (NO RACE VARIABLE): >60 ML/MIN/1.73 M^2
GLUCOSE SERPL-MCNC: 89 MG/DL (ref 70–110)
HCT VFR BLD AUTO: 39.3 % (ref 40–54)
HGB BLD-MCNC: 13.3 G/DL (ref 14–18)
IMM GRANULOCYTES # BLD AUTO: 0.02 K/UL (ref 0–0.04)
IMM GRANULOCYTES NFR BLD AUTO: 0.3 % (ref 0–0.5)
LYMPHOCYTES # BLD AUTO: 1.3 K/UL (ref 1–4.8)
LYMPHOCYTES NFR BLD: 20.3 % (ref 18–48)
MCH RBC QN AUTO: 32.4 PG (ref 27–31)
MCHC RBC AUTO-ENTMCNC: 33.8 G/DL (ref 32–36)
MCV RBC AUTO: 96 FL (ref 82–98)
MONOCYTES # BLD AUTO: 0.6 K/UL (ref 0.3–1)
MONOCYTES NFR BLD: 9.2 % (ref 4–15)
NEUTROPHILS # BLD AUTO: 4 K/UL (ref 1.8–7.7)
NEUTROPHILS NFR BLD: 62.3 % (ref 38–73)
NRBC BLD-RTO: 0 /100 WBC
PLATELET # BLD AUTO: 243 K/UL (ref 150–450)
PMV BLD AUTO: 8.9 FL (ref 9.2–12.9)
POTASSIUM SERPL-SCNC: 4.2 MMOL/L (ref 3.5–5.1)
RBC # BLD AUTO: 4.11 M/UL (ref 4.6–6.2)
SODIUM SERPL-SCNC: 139 MMOL/L (ref 136–145)
WBC # BLD AUTO: 6.49 K/UL (ref 3.9–12.7)

## 2023-10-14 PROCEDURE — 32551 INSERTION OF CHEST TUBE: CPT | Mod: LT,,, | Performed by: THORACIC SURGERY (CARDIOTHORACIC VASCULAR SURGERY)

## 2023-10-14 PROCEDURE — 32551 CHEST TUBE INSERTION: ICD-10-PCS | Mod: LT,,, | Performed by: THORACIC SURGERY (CARDIOTHORACIC VASCULAR SURGERY)

## 2023-10-14 PROCEDURE — 36415 COLL VENOUS BLD VENIPUNCTURE: CPT | Performed by: INTERNAL MEDICINE

## 2023-10-14 PROCEDURE — 80048 BASIC METABOLIC PNL TOTAL CA: CPT | Performed by: INTERNAL MEDICINE

## 2023-10-14 PROCEDURE — 63600175 PHARM REV CODE 636 W HCPCS: Performed by: INTERNAL MEDICINE

## 2023-10-14 PROCEDURE — 25000003 PHARM REV CODE 250: Performed by: THORACIC SURGERY (CARDIOTHORACIC VASCULAR SURGERY)

## 2023-10-14 PROCEDURE — 85025 COMPLETE CBC W/AUTO DIFF WBC: CPT | Performed by: INTERNAL MEDICINE

## 2023-10-14 PROCEDURE — 63600175 PHARM REV CODE 636 W HCPCS: Performed by: THORACIC SURGERY (CARDIOTHORACIC VASCULAR SURGERY)

## 2023-10-14 PROCEDURE — 21400001 HC TELEMETRY ROOM

## 2023-10-14 PROCEDURE — 25000003 PHARM REV CODE 250: Performed by: INTERNAL MEDICINE

## 2023-10-14 PROCEDURE — 94761 N-INVAS EAR/PLS OXIMETRY MLT: CPT

## 2023-10-14 RX ORDER — ACETAMINOPHEN 325 MG/1
650 TABLET ORAL EVERY 6 HOURS
Status: DISCONTINUED | OUTPATIENT
Start: 2023-10-15 | End: 2023-10-14

## 2023-10-14 RX ORDER — LIDOCAINE HYDROCHLORIDE AND EPINEPHRINE 10; 10 MG/ML; UG/ML
30 INJECTION, SOLUTION INFILTRATION; PERINEURAL ONCE
Status: DISCONTINUED | OUTPATIENT
Start: 2023-10-14 | End: 2023-10-14

## 2023-10-14 RX ORDER — ACETAMINOPHEN 325 MG/1
650 TABLET ORAL EVERY 6 HOURS
Status: DISCONTINUED | OUTPATIENT
Start: 2023-10-14 | End: 2023-10-18 | Stop reason: HOSPADM

## 2023-10-14 RX ORDER — KETOROLAC TROMETHAMINE 30 MG/ML
15 INJECTION, SOLUTION INTRAMUSCULAR; INTRAVENOUS EVERY 8 HOURS
Status: COMPLETED | OUTPATIENT
Start: 2023-10-14 | End: 2023-10-17

## 2023-10-14 RX ADMIN — SENNOSIDES AND DOCUSATE SODIUM 1 TABLET: 8.6; 5 TABLET ORAL at 09:10

## 2023-10-14 RX ADMIN — SENNOSIDES AND DOCUSATE SODIUM 1 TABLET: 8.6; 5 TABLET ORAL at 08:10

## 2023-10-14 RX ADMIN — KETOROLAC TROMETHAMINE 15 MG: 30 INJECTION, SOLUTION INTRAMUSCULAR; INTRAVENOUS at 06:10

## 2023-10-14 RX ADMIN — MORPHINE SULFATE 1 MG: 4 INJECTION INTRAVENOUS at 06:10

## 2023-10-14 RX ADMIN — Medication 6 MG: at 08:10

## 2023-10-14 RX ADMIN — ACETAMINOPHEN 650 MG: 325 TABLET ORAL at 06:10

## 2023-10-14 RX ADMIN — LIDOCAINE HYDROCHLORIDE 1 ML: 10; .005 INJECTION, SOLUTION EPIDURAL; INFILTRATION; INTRACAUDAL; PERINEURAL at 06:10

## 2023-10-14 RX ADMIN — TRAMADOL HYDROCHLORIDE 50 MG: 50 TABLET, COATED ORAL at 08:10

## 2023-10-14 RX ADMIN — VALSARTAN 320 MG: 160 TABLET, FILM COATED ORAL at 09:10

## 2023-10-14 RX ADMIN — ATORVASTATIN CALCIUM 10 MG: 10 TABLET, FILM COATED ORAL at 09:10

## 2023-10-14 NOTE — NURSING
Per pt Dr Knight placed CT back to suction, will confirm with MD     Confirmed with DR Knight that Chest tube is back to suction at - 40 suction. MD adjusted setting at bedside

## 2023-10-14 NOTE — ASSESSMENT & PLAN NOTE
The patient is a 68-year-old male who was admitted with spontaneous large left pneumothorax.  Patient had a pigtail catheter placed with re-expansion of left lung.  No air leak in Pleur-evac.  Will repeat chest x-ray in the morning.  Obtain CT scan of the chest to rule out primary lung pathology.

## 2023-10-14 NOTE — PROCEDURES
"Brayan Mills is a 68 y.o. male patient.    Temp: 99.5 °F (37.5 °C) (10/14/23 1529)  Pulse: 79 (10/14/23 152)  Resp: 18 (10/14/23 1813)  BP: 134/70 (10/14/23 1529)  SpO2: (!) 94 % (10/14/23 152)  Weight: 95 kg (209 lb 7 oz) (10/14/23 0000)  Height: 5' 10" (177.8 cm) (10/13/23 1733)       Chest Tube Insertion    Date/Time: 10/14/2023 6:18 PM  Location procedure was performed: PROV BR CT SURGERY    Performed by: Chung Knight MD  Authorized by: Chung Knight MD  Pre-operative diagnosis: pneumothorax  Consent Done: Yes  Consent: Verbal consent obtained.  Risks and benefits: risks, benefits and alternatives were discussed  Consent given by: patient  Patient understanding: patient states understanding of the procedure being performed  Patient consent: the patient's understanding of the procedure matches consent given  Procedure consent: procedure consent matches procedure scheduled  Patient identity confirmed: name,  and verbally with patient  Time out: Immediately prior to procedure a "time out" was called to verify the correct patient, procedure, equipment, support staff and site/side marked as required.  Indications: pneumothorax    Patient sedated: no    Anesthesia:  Local Anesthetic: lidocaine 1% with epinephrine  Preparation: skin prepped with ChloraPrep  Placement location: left lateral  Scalpel size: 11  Tube size: 24 Setswana  Dissection instrument: Zoie clamp and scissors  Ultrasound guidance: no  Tension pneumothorax heard: no  Tube connected to: suction  Dressing: 4x4 sterile gauze  Post-insertion x-ray findings: tube in good position  Patient tolerance: Patient tolerated the procedure well with no immediate complications          10/14/2023    "

## 2023-10-14 NOTE — SUBJECTIVE & OBJECTIVE
Interval History: CT chest done overnight showed mod pneumothorax and pericardial cyst. Discussed with Dr. Knight- he had placed chest tube to water seal but placed it back to suction at -40 due to persistent PTX. Pt seen and examined with spouse at bedside. He reports some discomfort at chest tube insertion site but no CP, SOB. Sats stable on room air.     Review of Systems  Objective:     Vital Signs (Most Recent):  Temp: 99.1 °F (37.3 °C) (10/14/23 1112)  Pulse: 81 (10/14/23 1112)  Resp: 18 (10/14/23 1112)  BP: 119/62 (10/14/23 1112)  SpO2: (!) 93 % (10/14/23 1112) Vital Signs (24h Range):  Temp:  [97.2 °F (36.2 °C)-99.1 °F (37.3 °C)] 99.1 °F (37.3 °C)  Pulse:  [] 81  Resp:  [16-20] 18  SpO2:  [93 %-98 %] 93 %  BP: (115-161)/(59-94) 119/62     Weight: 95 kg (209 lb 7 oz)  Body mass index is 30.05 kg/m².    Intake/Output Summary (Last 24 hours) at 10/14/2023 1317  Last data filed at 10/14/2023 0659  Gross per 24 hour   Intake 200 ml   Output 47 ml   Net 153 ml         Physical Exam  Vitals and nursing note reviewed.   Constitutional:       Appearance: Normal appearance.   Cardiovascular:      Rate and Rhythm: Normal rate and regular rhythm.      Heart sounds: No murmur heard.     No friction rub. No gallop.   Pulmonary:      Effort: Pulmonary effort is normal.      Breath sounds: No wheezing, rhonchi or rales.      Comments: Decreased breath sounds at L lung base, on room air   Abdominal:      General: Bowel sounds are normal. There is no distension.      Palpations: Abdomen is soft.      Tenderness: There is no abdominal tenderness. There is no guarding or rebound.   Musculoskeletal:      Right lower leg: No edema.      Left lower leg: No edema.   Skin:     General: Skin is warm and dry.      Comments: L chest tube in place    Neurological:      Mental Status: He is alert and oriented to person, place, and time. Mental status is at baseline.             Significant Labs: All pertinent labs within the past  24 hours have been reviewed.    Significant Imaging: I have reviewed all pertinent imaging results/findings within the past 24 hours.

## 2023-10-14 NOTE — PROGRESS NOTES
[facility] - Tele[facility] ([facility])  Cardiothoracic Surgery  Progress Note    Patient Name: Brayan Mills  MRN: [id number]  Admission Date: 10/13/2023  Hospital Length of Stay: 0 days  Code Status: Full Code   Attending Physician: Mariola Caldwell MD   Referring Provider: Self, Aaareferral  Principal Problem:Spontaneous pneumothorax            Subjective:     Post-Op Info:  * No surgery found *         No current facility-administered medications on file prior to encounter.     Current Outpatient Medications on File Prior to Encounter   Medication Sig    atorvastatin (LIPITOR) 10 MG tablet Take 1 tablet by mouth once daily    clotrimazole-betamethasone 1-0.05% (LOTRISONE) cream APPLY DAB TO AFFECTED AREA DAILY AS NEEDED    JANUMET -1,000 mg TM24 Take 1 tablet by mouth once daily    meloxicam (MOBIC) 15 MG tablet Take 1 tablet (15 mg total) by mouth once daily.    olmesartan (BENICAR) 40 MG tablet Take 1 tablet by mouth once daily    tadalafiL (CIALIS) 20 MG Tab TAKE 1 TABLET BY MOUTH EVERY OTHER DAY AS NEEDED FOR ERECTILE DYSFUNCTION    traMADoL (ULTRAM) 50 mg tablet Take 50 mg by mouth every 4 to 6 hours as needed for Pain.       Review of patient's allergies indicates:  No Known Allergies    Past Medical History:   Diagnosis Date    Hyperlipidemia     Hypertension     Malignant tumor of prostate      Past Surgical History:   Procedure Laterality Date    PROSTATECTOMY       Family History       Problem Relation (Age of Onset)    Hypertension Mother          Tobacco Use    Smoking status: Never    Smokeless tobacco: Never   Substance and Sexual Activity    Alcohol use: No    Drug use: No    Sexual activity: Not Currently     Review of Systems   Constitutional:  Positive for activity change and fatigue. Negative for unexpected weight change.   HENT: Negative.     Eyes: Negative.    Respiratory:  Positive for cough and shortness of breath.    Cardiovascular:  Positive for chest pain.    Gastrointestinal: Negative.    Endocrine: Negative.    Genitourinary: Negative.    Musculoskeletal: Negative.    Allergic/Immunologic: Negative.    Neurological: Negative.    Hematological: Negative.    Psychiatric/Behavioral: Negative.       Objective:     Vital Signs (Most Recent):  Temp: 98.8 °F (37.1 °C) (10/13/23 1733)  Pulse: 79 (10/13/23 1733)  Resp: 20 (10/13/23 1733)  BP: (!) 141/94 (10/13/23 1733)  SpO2: 95 % (10/13/23 1733) Vital Signs (24h Range):  Temp:  [98.8 °F (37.1 °C)-99.1 °F (37.3 °C)] 98.8 °F (37.1 °C)  Pulse:  [72-95] 79  Resp:  [16-20] 20  SpO2:  [95 %-98 %] 95 %  BP: (126-161)/(67-94) 141/94     Weight: 94.6 kg (208 lb 8.9 oz)  Body mass index is 29.92 kg/m².    SpO2: 95 %        Intake/Output - Last 3 Shifts         10/11 0700  10/12 0659 10/12 0700  10/13 0659 10/13 0700  10/14 0659    P.O.   200    Total Intake(mL/kg)   200 (2.1)    Net   +200           Urine Occurrence   1 x             Lines/Drains/Airways       Drain  Duration                  Chest Tube 10/13/23 1444 Tube - 1 Left Midaxillary;Fourth intercostal space 8 Fr. <1 day              Peripheral Intravenous Line  Duration                  Peripheral IV - Single Lumen 10/13/23 1408 20 G Left Antecubital <1 day                     STS Risk Score: n/a       Physical Exam  Constitutional:       Appearance: Normal appearance.   HENT:      Head: Normocephalic and atraumatic.      Nose: Nose normal.      Mouth/Throat:      Mouth: Mucous membranes are moist.   Eyes:      Extraocular Movements: Extraocular movements intact.      Conjunctiva/sclera: Conjunctivae normal.      Pupils: Pupils are equal, round, and reactive to light.   Cardiovascular:      Rate and Rhythm: Normal rate and regular rhythm.      Heart sounds: Normal heart sounds.   Pulmonary:      Effort: Pulmonary effort is normal.      Breath sounds: Normal breath sounds.   Abdominal:      General: Abdomen is flat. Bowel sounds are normal.      Palpations: Abdomen is soft.    Musculoskeletal:      Right lower leg: No edema.      Left lower leg: No edema.   Skin:     General: Skin is warm and dry.   Neurological:      Mental Status: He is alert and oriented to person, place, and time.   Psychiatric:         Behavior: Behavior normal.            Significant Labs:  All pertinent labs from the last 24 hours have been reviewed.    Significant Diagnostics:  I have reviewed all pertinent imaging results/findings within the past 24 hours.      Assessment/Plan:     * Spontaneous pneumothorax  The patient is a 68-year-old male who was admitted with spontaneous large left pneumothorax.  Patient had a pigtail catheter placed with re-expansion of left lung.  No air leak in Pleur-evac.  Will repeat chest x-ray in the morning.  Obtain CT scan of the chest to rule out primary lung pathology.        Chung Knight MD  Cardiothoracic Surgery  [facility] ([facility])

## 2023-10-14 NOTE — ASSESSMENT & PLAN NOTE
- unclear etiology. Pt is a non-smoker, no COPD/emphysema, no recent trauma/injuries.   - s/p L chest tube placement in the ED, currently to wall suction   - CT surgery consulted; defer management   - Discussed with Dr. Knight- he plans to keep chest tube to wall suction at -40 and repeat CXR later this afternoon   - Monitor continuous pulse ox   - Monitor daily CXR   - Pain control with PO tramadol and IV morphine low dose as needed for breakthrough pain

## 2023-10-14 NOTE — PROGRESS NOTES
[facility] ([facility] Medicine  Progress Note    Patient Name: Brayan Mills  MRN: [id number]  Patient Class: IP- Inpatient   Admission Date: 10/13/2023  Length of Stay: 1 days  Attending Physician: Mariola Caldwell MD  Primary Care Provider: Maria Ines Bingham MD        Subjective:     Principal Problem:Spontaneous pneumothorax        HPI:  Pt is a 69 YO  male with PMH notable for essential HTN, HLD, pre-DM, prostate ca (s/p prostatectomy) who presented to the ED on 10/13/23 for evaluation of L sided chest pain and shortness of breath. Pt reports he was in his usual state of health until approx 7 am on morning of admission when he began feeling fatigued, short of breath and had vague left sided chest discomfort. Pt reports he took some Advil which did not help the pain. Went to physical therapy for this shoulder but became fatigued and short of breath with activity which was unusual for him. Denies any prior similar episodes. Reports associated intermittent dry cough. Per patient's daughter, his wife noticed dry cough a few days ago but more pronounced this AM. In the ED, initial VS: temp 99.1, HR 95, RR 16, /67, sats 95% on RA. Work up notable for CXR with L tension pneumothorax. Labs fairly unremarkable. EKG NSR, trop and BNP negative. L sided chest tube was placed by the ED provider, CT surgery Dr. Knight consulted for chest tube management. Subsequent CXR showed near resolution of L PTX. Hospital medicine consulted for admission.     Pt reports he is very active at baseline. Denies any recent illness, trauma, falls, straining or injuries. He is not a smoker, denies any prior hx of COPD/asthma/emphysema. Reports CP and dyspnea improved since placement of chest tube.       Overview/Hospital Course:  Pt admitted to hospital medicine. CT surgery Dr. Knight following for chest tube management. CT chest done to eval PTX and possible underlying lung pathology was notable  for pericardial cyst. Discussed with Dr. Knight- jason recommends no surgical intervention for pericardial cyst as pt does not have any symptoms from it and does not have any signs of infection.       Interval History: CT chest done overnight showed mod pneumothorax and pericardial cyst. Discussed with Dr. Knight- he had placed chest tube to water seal but placed it back to suction at -40 due to persistent PTX. Pt seen and examined with spouse at bedside. He reports some discomfort at chest tube insertion site but no CP, SOB. Sats stable on room air.     Review of Systems  Objective:     Vital Signs (Most Recent):  Temp: 99.1 °F (37.3 °C) (10/14/23 1112)  Pulse: 81 (10/14/23 1112)  Resp: 18 (10/14/23 1112)  BP: 119/62 (10/14/23 1112)  SpO2: (!) 93 % (10/14/23 1112) Vital Signs (24h Range):  Temp:  [97.2 °F (36.2 °C)-99.1 °F (37.3 °C)] 99.1 °F (37.3 °C)  Pulse:  [] 81  Resp:  [16-20] 18  SpO2:  [93 %-98 %] 93 %  BP: (115-161)/(59-94) 119/62     Weight: 95 kg (209 lb 7 oz)  Body mass index is 30.05 kg/m².    Intake/Output Summary (Last 24 hours) at 10/14/2023 1317  Last data filed at 10/14/2023 0659  Gross per 24 hour   Intake 200 ml   Output 47 ml   Net 153 ml         Physical Exam  Vitals and nursing note reviewed.   Constitutional:       Appearance: Normal appearance.   Cardiovascular:      Rate and Rhythm: Normal rate and regular rhythm.      Heart sounds: No murmur heard.     No friction rub. No gallop.   Pulmonary:      Effort: Pulmonary effort is normal.      Breath sounds: No wheezing, rhonchi or rales.      Comments: Decreased breath sounds at L lung base, on room air   Abdominal:      General: Bowel sounds are normal. There is no distension.      Palpations: Abdomen is soft.      Tenderness: There is no abdominal tenderness. There is no guarding or rebound.   Musculoskeletal:      Right lower leg: No edema.      Left lower leg: No edema.   Skin:     General: Skin is warm and dry.      Comments: L chest  tube in place    Neurological:      Mental Status: He is alert and oriented to person, place, and time. Mental status is at baseline.             Significant Labs: All pertinent labs within the past 24 hours have been reviewed.    Significant Imaging: I have reviewed all pertinent imaging results/findings within the past 24 hours.      Assessment/Plan:      * Spontaneous pneumothorax  - unclear etiology. Pt is a non-smoker, no COPD/emphysema, no recent trauma/injuries.   - s/p L chest tube placement in the ED, currently to wall suction   - CT surgery consulted; defer management   - Discussed with Dr. Knight- he plans to keep chest tube to wall suction at -40 and repeat CXR later this afternoon   - Monitor continuous pulse ox   - Monitor daily CXR   - Pain control with PO tramadol and IV morphine low dose as needed for breakthrough pain       Pericardial cyst  - noted incidentally on CT imaging of the chest    - Cardiothoracic surgery following; discussed with Dr. Knight- he recommends no surgical drainage or intervention at this time as pt is not having symptoms from cyst and has no signs of infection         Coronary artery calcification seen on CT scan  PT without chest tightness at this time, shortness of breath resolved after chest tube placement. Initial trops neg x 2, EKG with no acute ischemic changes  Outpatient referral to cardiology for further management       Hyperlipidemia  - continue home statin       Essential hypertension, benign  - continue home ARB  - monitor BP   - IV hydralazine as needed       Pre-diabetes  - hold home Janumet  - monitor BG with BMP   - hypoglycemia protocol         VTE Risk Mitigation (From admission, onward)         Ordered     Reason for No Pharmacological VTE Prophylaxis  Once        Question:  Reasons:  Answer:  Physician Provided (leave comment)  Comment:  possible procedural intervention    10/13/23 1620     IP VTE HIGH RISK PATIENT  Once         10/13/23 1620     Place  sequential compression device  Until discontinued         10/13/23 5600                Discharge Planning   FRANCES:      Code Status: Full Code   Is the patient medically ready for discharge?: No    Reason for patient still in hospital (select all that apply): Patient trending condition, Treatment and Consult recommendations  Discharge Plan A: Home with family                  Mariola Caldwell MD  Department of Hospital Medicine   [facility] ([facility])

## 2023-10-14 NOTE — ASSESSMENT & PLAN NOTE
PT without chest tightness at this time, shortness of breath resolved after chest tube placement. Initial trops neg x 2, EKG with no acute ischemic changes  Outpatient referral to cardiology for further management

## 2023-10-14 NOTE — PLAN OF CARE
[facility] - Telemetry (Hospital)  Initial Discharge Assessment       Primary Care Provider: Maria Ines Bingham MD    Admission Diagnosis: Spontaneous pneumothorax [J93.83]  Pneumothorax on left [J93.9]  Chest pain [R07.9]    Admission Date: 10/13/2023  Expected Discharge Date: Per attending     Transition of Care Barriers: None    Payor: MEDICARE / Plan: MEDICARE PART A & B / Product Type: Government /     Extended Emergency Contact Information  Primary Emergency Contact: stephany pantoja  Mobile Phone: [phone]  Relation: Daughter   needed? No  Secondary Emergency Contact: remi pantoja  Mobile Phone: [phone]  Relation: Sponsor   needed? No    Discharge Plan A: Home with family         [id number] [place] [address]  [address] [facility] [address]  Phone: [phone] Fax: [phone]      Initial Assessment (most recent)       Adult Discharge Assessment - 10/14/23 0850          Discharge Assessment    Assessment Type Discharge Planning Assessment     Confirmed/corrected address, phone number and insurance Yes     Confirmed Demographics Correct on Facesheet     Source of Information patient     Communicated FRANCES with patient/caregiver Date not available/Unable to determine     Reason For Admission spontaneous pneumothorax     People in Home spouse     Do you expect to return to your current living situation? Yes     Do you have help at home or someone to help you manage your care at home? Yes     Who are your caregiver(s) and their phone number(s)? Family     Prior to hospitilization cognitive status: Alert/Oriented     Current cognitive status: Alert/Oriented     Home Accessibility wheelchair accessible     Home Layout Able to live on 1st floor     Equipment Currently Used at Home none     Readmission within 30 days? No     Patient currently being followed by outpatient case management? No     Do you currently have service(s) that help you  manage your care at home? No     Do you take prescription medications? Yes     Do you have prescription coverage? Yes     Coverage Medicare     Do you have any problems affording any of your prescribed medications? No     Is the patient taking medications as prescribed? yes     Who is going to help you get home at discharge? Spouse     How do you get to doctors appointments? car, drives self     Are you on dialysis? No     Do you take coumadin? No     DME Needed Upon Discharge  none     Discharge Plan discussed with: Spouse/sig other;Patient     Transition of Care Barriers None     Discharge Plan A Home with family                      Patient has no d/c needs at this time. Sw to follow up, as needed, for d/c planning purposes. Patient has no d/c needs at this time. Sw to follow up, as needed, for d/c planning purposes.

## 2023-10-14 NOTE — HPI
The patient is a 68-year-old male with hypertension, hyperlipidemia prostate cancer who presented with complaints of left-sided chest pain and shortness breath.  Patient developed symptoms this morning when he noticed fatigue shortness of breath and dyspnea on exertion.  Patient had a chest x-ray done in the emergency room which showed a left pneumothorax.  A pigtail catheter was placed with re-expansion of the lung.  The patient denies COPD, or tobacco use or trauma.

## 2023-10-14 NOTE — SUBJECTIVE & OBJECTIVE
No current facility-administered medications on file prior to encounter.     Current Outpatient Medications on File Prior to Encounter   Medication Sig    atorvastatin (LIPITOR) 10 MG tablet Take 1 tablet by mouth once daily    clotrimazole-betamethasone 1-0.05% (LOTRISONE) cream APPLY DAB TO AFFECTED AREA DAILY AS NEEDED    JANUMET -1,000 mg TM24 Take 1 tablet by mouth once daily    meloxicam (MOBIC) 15 MG tablet Take 1 tablet (15 mg total) by mouth once daily.    olmesartan (BENICAR) 40 MG tablet Take 1 tablet by mouth once daily    tadalafiL (CIALIS) 20 MG Tab TAKE 1 TABLET BY MOUTH EVERY OTHER DAY AS NEEDED FOR ERECTILE DYSFUNCTION    traMADoL (ULTRAM) 50 mg tablet Take 50 mg by mouth every 4 to 6 hours as needed for Pain.       Review of patient's allergies indicates:  No Known Allergies    Past Medical History:   Diagnosis Date    Hyperlipidemia     Hypertension     Malignant tumor of prostate      Past Surgical History:   Procedure Laterality Date    PROSTATECTOMY       Family History       Problem Relation (Age of Onset)    Hypertension Mother          Tobacco Use    Smoking status: Never    Smokeless tobacco: Never   Substance and Sexual Activity    Alcohol use: No    Drug use: No    Sexual activity: Not Currently     Review of Systems   Constitutional:  Positive for activity change and fatigue. Negative for unexpected weight change.   HENT: Negative.     Eyes: Negative.    Respiratory:  Positive for cough and shortness of breath.    Cardiovascular:  Positive for chest pain.   Gastrointestinal: Negative.    Endocrine: Negative.    Genitourinary: Negative.    Musculoskeletal: Negative.    Allergic/Immunologic: Negative.    Neurological: Negative.    Hematological: Negative.    Psychiatric/Behavioral: Negative.       Objective:     Vital Signs (Most Recent):  Temp: 98.8 °F (37.1 °C) (10/13/23 1733)  Pulse: 79 (10/13/23 1733)  Resp: 20 (10/13/23 1733)  BP: (!) 141/94 (10/13/23 1733)  SpO2: 95 % (10/13/23  1733) Vital Signs (24h Range):  Temp:  [98.8 °F (37.1 °C)-99.1 °F (37.3 °C)] 98.8 °F (37.1 °C)  Pulse:  [72-95] 79  Resp:  [16-20] 20  SpO2:  [95 %-98 %] 95 %  BP: (126-161)/(67-94) 141/94     Weight: 94.6 kg (208 lb 8.9 oz)  Body mass index is 29.92 kg/m².    SpO2: 95 %        Intake/Output - Last 3 Shifts         10/11 0700  10/12 0659 10/12 0700  10/13 0659 10/13 0700  10/14 0659    P.O.   200    Total Intake(mL/kg)   200 (2.1)    Net   +200           Urine Occurrence   1 x             Lines/Drains/Airways       Drain  Duration                  Chest Tube 10/13/23 1444 Tube - 1 Left Midaxillary;Fourth intercostal space 8 Fr. <1 day              Peripheral Intravenous Line  Duration                  Peripheral IV - Single Lumen 10/13/23 1408 20 G Left Antecubital <1 day                     STS Risk Score: n/a       Physical Exam  Constitutional:       Appearance: Normal appearance.   HENT:      Head: Normocephalic and atraumatic.      Nose: Nose normal.      Mouth/Throat:      Mouth: Mucous membranes are moist.   Eyes:      Extraocular Movements: Extraocular movements intact.      Conjunctiva/sclera: Conjunctivae normal.      Pupils: Pupils are equal, round, and reactive to light.   Cardiovascular:      Rate and Rhythm: Normal rate and regular rhythm.      Heart sounds: Normal heart sounds.   Pulmonary:      Effort: Pulmonary effort is normal.      Breath sounds: Normal breath sounds.   Abdominal:      General: Abdomen is flat. Bowel sounds are normal.      Palpations: Abdomen is soft.   Musculoskeletal:      Right lower leg: No edema.      Left lower leg: No edema.   Skin:     General: Skin is warm and dry.   Neurological:      Mental Status: He is alert and oriented to person, place, and time.   Psychiatric:         Behavior: Behavior normal.            Significant Labs:  All pertinent labs from the last 24 hours have been reviewed.    Significant Diagnostics:  I have reviewed all pertinent imaging  results/findings within the past 24 hours.

## 2023-10-14 NOTE — PLAN OF CARE
Problem: Adult Inpatient Plan of Care  Goal: Patient-Specific Goal (Individualized)  Outcome: Ongoing, Progressing     Problem: Respiratory Compromise (Pneumothorax)  Goal: Optimal Oxygenation and Ventilation  Outcome: Ongoing, Progressing     Pt AAO  Vitals stable   Afebrile   Saturating well on room air   Continuous pulse ox at bedside   L chest tube to - 40 cm suction    Cxr today 1600  Daily cxr   Pt independent   Fall precautions in place     24 hour chart check completed

## 2023-10-14 NOTE — HOSPITAL COURSE
Pt admitted to hospital medicine. CT surgery Dr. Knight following for chest tube management. CT chest done to eval PTX and possible underlying lung pathology was notable for pericardial cyst. Discussed with Dr. Knight- he recommends no surgical intervention for pericardial cyst as pt does not have any symptoms from it and does not have any signs of infection. Chest tube was changed to larger bore chest tube on 10/14 by Dr. Knight. Interval improvement in pneumothorax. Chest tube  placed to waterseal on 10/16/23 and discontinued on 10/17/2023 by CT surgery. Subsequent CXR did not show any pneumothorax and patient was deemed stable for discharge home by CT surgery with close outpatient followup.     Pt seen and examined on day of discharge. Remains hemodynamically stable, oxygen sats stable on room air. He denies CP, SOB, cough. Appears stable for discharge home today per my exam. Followup with PCP in 3-5 days and CT surgery on 10/25/2023. Amb referrals also placed to Pulmonology and cardiology, see below for further details.

## 2023-10-15 LAB
ANION GAP SERPL CALC-SCNC: 11 MMOL/L (ref 8–16)
BASOPHILS # BLD AUTO: 0.03 K/UL (ref 0–0.2)
BASOPHILS NFR BLD: 0.5 % (ref 0–1.9)
BUN SERPL-MCNC: 14 MG/DL (ref 8–23)
CALCIUM SERPL-MCNC: 8.7 MG/DL (ref 8.7–10.5)
CHLORIDE SERPL-SCNC: 103 MMOL/L (ref 95–110)
CO2 SERPL-SCNC: 26 MMOL/L (ref 23–29)
CREAT SERPL-MCNC: 0.8 MG/DL (ref 0.5–1.4)
DIFFERENTIAL METHOD: ABNORMAL
EOSINOPHIL # BLD AUTO: 0.5 K/UL (ref 0–0.5)
EOSINOPHIL NFR BLD: 7.4 % (ref 0–8)
ERYTHROCYTE [DISTWIDTH] IN BLOOD BY AUTOMATED COUNT: 13.3 % (ref 11.5–14.5)
EST. GFR  (NO RACE VARIABLE): >60 ML/MIN/1.73 M^2
GLUCOSE SERPL-MCNC: 94 MG/DL (ref 70–110)
HCT VFR BLD AUTO: 40.2 % (ref 40–54)
HGB BLD-MCNC: 13.4 G/DL (ref 14–18)
IMM GRANULOCYTES # BLD AUTO: 0.03 K/UL (ref 0–0.04)
IMM GRANULOCYTES NFR BLD AUTO: 0.5 % (ref 0–0.5)
LYMPHOCYTES # BLD AUTO: 1.4 K/UL (ref 1–4.8)
LYMPHOCYTES NFR BLD: 21.6 % (ref 18–48)
MCH RBC QN AUTO: 31.7 PG (ref 27–31)
MCHC RBC AUTO-ENTMCNC: 33.3 G/DL (ref 32–36)
MCV RBC AUTO: 95 FL (ref 82–98)
MONOCYTES # BLD AUTO: 0.6 K/UL (ref 0.3–1)
MONOCYTES NFR BLD: 9.5 % (ref 4–15)
NEUTROPHILS # BLD AUTO: 3.8 K/UL (ref 1.8–7.7)
NEUTROPHILS NFR BLD: 60.5 % (ref 38–73)
NRBC BLD-RTO: 0 /100 WBC
PLATELET # BLD AUTO: 230 K/UL (ref 150–450)
PMV BLD AUTO: 8.7 FL (ref 9.2–12.9)
POTASSIUM SERPL-SCNC: 4 MMOL/L (ref 3.5–5.1)
RBC # BLD AUTO: 4.23 M/UL (ref 4.6–6.2)
SODIUM SERPL-SCNC: 140 MMOL/L (ref 136–145)
WBC # BLD AUTO: 6.34 K/UL (ref 3.9–12.7)

## 2023-10-15 PROCEDURE — 85025 COMPLETE CBC W/AUTO DIFF WBC: CPT | Performed by: INTERNAL MEDICINE

## 2023-10-15 PROCEDURE — 63600175 PHARM REV CODE 636 W HCPCS: Performed by: THORACIC SURGERY (CARDIOTHORACIC VASCULAR SURGERY)

## 2023-10-15 PROCEDURE — 21400001 HC TELEMETRY ROOM

## 2023-10-15 PROCEDURE — 80048 BASIC METABOLIC PNL TOTAL CA: CPT | Performed by: INTERNAL MEDICINE

## 2023-10-15 PROCEDURE — 94761 N-INVAS EAR/PLS OXIMETRY MLT: CPT

## 2023-10-15 PROCEDURE — 36415 COLL VENOUS BLD VENIPUNCTURE: CPT | Performed by: INTERNAL MEDICINE

## 2023-10-15 PROCEDURE — 63600175 PHARM REV CODE 636 W HCPCS: Performed by: INTERNAL MEDICINE

## 2023-10-15 PROCEDURE — 25000003 PHARM REV CODE 250: Performed by: THORACIC SURGERY (CARDIOTHORACIC VASCULAR SURGERY)

## 2023-10-15 PROCEDURE — 25000003 PHARM REV CODE 250: Performed by: INTERNAL MEDICINE

## 2023-10-15 RX ORDER — GUAIFENESIN 100 MG/5ML
200 SOLUTION ORAL EVERY 6 HOURS PRN
Status: DISCONTINUED | OUTPATIENT
Start: 2023-10-15 | End: 2023-10-18 | Stop reason: HOSPADM

## 2023-10-15 RX ORDER — HEPARIN SODIUM 5000 [USP'U]/ML
5000 INJECTION, SOLUTION INTRAVENOUS; SUBCUTANEOUS EVERY 8 HOURS
Status: DISCONTINUED | OUTPATIENT
Start: 2023-10-15 | End: 2023-10-18 | Stop reason: HOSPADM

## 2023-10-15 RX ADMIN — HEPARIN SODIUM 5000 UNITS: 5000 INJECTION INTRAVENOUS; SUBCUTANEOUS at 09:10

## 2023-10-15 RX ADMIN — ATORVASTATIN CALCIUM 10 MG: 10 TABLET, FILM COATED ORAL at 08:10

## 2023-10-15 RX ADMIN — TRAMADOL HYDROCHLORIDE 50 MG: 50 TABLET, COATED ORAL at 09:10

## 2023-10-15 RX ADMIN — KETOROLAC TROMETHAMINE 15 MG: 30 INJECTION, SOLUTION INTRAMUSCULAR; INTRAVENOUS at 09:10

## 2023-10-15 RX ADMIN — HEPARIN SODIUM 5000 UNITS: 5000 INJECTION INTRAVENOUS; SUBCUTANEOUS at 02:10

## 2023-10-15 RX ADMIN — VALSARTAN 320 MG: 160 TABLET, FILM COATED ORAL at 08:10

## 2023-10-15 RX ADMIN — KETOROLAC TROMETHAMINE 15 MG: 30 INJECTION, SOLUTION INTRAMUSCULAR; INTRAVENOUS at 02:10

## 2023-10-15 RX ADMIN — KETOROLAC TROMETHAMINE 15 MG: 30 INJECTION, SOLUTION INTRAMUSCULAR; INTRAVENOUS at 06:10

## 2023-10-15 RX ADMIN — ACETAMINOPHEN 650 MG: 325 TABLET ORAL at 06:10

## 2023-10-15 RX ADMIN — ACETAMINOPHEN 650 MG: 325 TABLET ORAL at 11:10

## 2023-10-15 RX ADMIN — SENNOSIDES AND DOCUSATE SODIUM 1 TABLET: 8.6; 5 TABLET ORAL at 08:10

## 2023-10-15 NOTE — PROGRESS NOTES
[facility] - Telemetry [facility])  Cardiothoracic Surgery  Progress Note    Patient Name: Brayan Mills  MRN: [id number]  Admission Date: 10/13/2023  Hospital Length of Stay: 2 days  Code Status: Full Code   Attending Physician: Mariola Caldwell MD   Referring Provider: Self, Aaareferral  Principal Problem:Spontaneous pneumothorax            Subjective:     Post-Op Info:  * No surgery found *         Interval History:  Patient is admitted for management of left pneumothorax.    ROS  Medications:  Continuous Infusions:  Scheduled Meds:   acetaminophen  650 mg Oral Q6H    atorvastatin  10 mg Oral Daily    heparin (porcine)  5,000 Units Subcutaneous Q8H    ketorolac  15 mg Intravenous Q8H    senna-docusate 8.6-50 mg  1 tablet Oral BID    valsartan  320 mg Oral Daily     PRN Meds:acetaminophen, acetaminophen, albuterol-ipratropium, dextrose 10%, dextrose 10%, glucagon (human recombinant), glucose, glucose, guaiFENesin 100 mg/5 ml, hydrALAZINE, melatonin, morphine, naloxone, ondansetron, polyethylene glycol, sodium chloride 0.9%, traMADoL     Objective:     Vital Signs (Most Recent):  Temp: 97.2 °F (36.2 °C) (10/15/23 1123)  Pulse: 80 (10/15/23 1123)  Resp: 18 (10/15/23 1123)  BP: (!) 151/74 (10/15/23 1123)  SpO2: 96 % (10/15/23 1123) Vital Signs (24h Range):  Temp:  [97.2 °F (36.2 °C)-99.5 °F (37.5 °C)] 97.2 °F (36.2 °C)  Pulse:  [66-97] 80  Resp:  [16-20] 18  SpO2:  [93 %-96 %] 96 %  BP: (125-153)/(67-80) 151/74     Weight: 95 kg (209 lb 7 oz)  Body mass index is 30.05 kg/m².    SpO2: 96 %       Intake/Output - Last 3 Shifts         10/13 0700  10/14 0659 10/14 0700  10/15 0659 10/15 0700  10/16 0659    P.O. 200  360    Total Intake(mL/kg) 200 (2.1)  360 (3.8)    Urine (mL/kg/hr)   400 (0.9)    Chest Tube 47      Total Output 47  400    Net +153  -40           Urine Occurrence 1 x              Lines/Drains/Airways       Drain  Duration                  Chest Tube 10/13/23 1444 Tube - 1 Left Midaxillary;Fourth  intercostal space 8 Fr. 1 day              Peripheral Intravenous Line  Duration                  Peripheral IV - Single Lumen 10/13/23 1408 20 G Left Antecubital 1 day                     Physical Exam  Constitutional:       Appearance: Normal appearance.   HENT:      Head: Normocephalic and atraumatic.   Cardiovascular:      Heart sounds: Normal heart sounds.   Abdominal:      General: Abdomen is flat.      Palpations: Abdomen is soft.   Skin:     General: Skin is warm and dry.   Neurological:      Mental Status: He is alert and oriented to person, place, and time.   Psychiatric:         Behavior: Behavior normal.            Significant Labs:  All pertinent labs from the last 24 hours have been reviewed.    Significant Diagnostics:  I have reviewed all pertinent imaging results/findings within the past 24 hours.    Assessment/Plan:     * Spontaneous pneumothorax  The patient is a 68-year-old male who was admitted with spontaneous large left pneumothorax.  Patient had a pigtail catheter placed with re-expansion of left lung.  No air leak in Pleur-evac.  Will repeat chest x-ray in the morning.  Obtain CT scan of the chest to rule out primary lung pathology.    10/15/2023   The patient is status post large bore chest tube placement yesterday due to a persistent pneumothorax.  Left lung is better in plated on today's x-ray.  Continue chest tube to suction.  No air leak observed in Pleur-evac.  Plan on pain patient on water seal if no no further air leaks in the next 24 hours.  Patient's pain is well controlled with current pain management.        Chung Knight MD  Cardiothoracic Surgery  [facility] - Telemetry ([facility])

## 2023-10-15 NOTE — PLAN OF CARE
Problem: Adult Inpatient Plan of Care  Goal: Patient-Specific Goal (Individualized)  Outcome: Ongoing, Progressing     Pt AAO  Vitals stable   Afebrile   Saturating well on room air   Continuous pulse ox at bedside   L chest tube to - 20 cm suction        Drg changed today   Daily cxr   Pt independent   Fall precautions in place      24 hour chart check completed

## 2023-10-15 NOTE — PROGRESS NOTES
[facility] ([facility] Medicine  Progress Note    Patient Name: Brayan Mills  MRN: [id number]  Patient Class: IP- Inpatient   Admission Date: 10/13/2023  Length of Stay: 2 days  Attending Physician: Mariola Caldwell MD  Primary Care Provider: Maria Ines Bingham MD        Subjective:     Principal Problem:Spontaneous pneumothorax        HPI:  Pt is a 67 YO  male with PMH notable for essential HTN, HLD, pre-DM, prostate ca (s/p prostatectomy) who presented to the ED on 10/13/23 for evaluation of L sided chest pain and shortness of breath. Pt reports he was in his usual state of health until approx 7 am on morning of admission when he began feeling fatigued, short of breath and had vague left sided chest discomfort. Pt reports he took some Advil which did not help the pain. Went to physical therapy for this shoulder but became fatigued and short of breath with activity which was unusual for him. Denies any prior similar episodes. Reports associated intermittent dry cough. Per patient's daughter, his wife noticed dry cough a few days ago but more pronounced this AM. In the ED, initial VS: temp 99.1, HR 95, RR 16, /67, sats 95% on RA. Work up notable for CXR with L tension pneumothorax. Labs fairly unremarkable. EKG NSR, trop and BNP negative. L sided chest tube was placed by the ED provider, CT surgery Dr. Knight consulted for chest tube management. Subsequent CXR showed near resolution of L PTX. Hospital medicine consulted for admission.     Pt reports he is very active at baseline. Denies any recent illness, trauma, falls, straining or injuries. He is not a smoker, denies any prior hx of COPD/asthma/emphysema. Reports CP and dyspnea improved since placement of chest tube.       Overview/Hospital Course:  Pt admitted to hospital medicine. CT surgery Dr. Knight following for chest tube management. CT chest done to eval PTX and possible underlying lung pathology was notable  for pericardial cyst. Discussed with Dr. Knight- he recommends no surgical intervention for pericardial cyst as pt does not have any symptoms from it and does not have any signs of infection. Chest tube was changed to larger bore chest tube on 10/14 by Dr. Knight      Interval History: NAEON. Pt seen with wife at bedside. Reports no CP, SOB. Has soreness at chest tube site. CXR this AM shows stable tiny L PTX.     Review of Systems  Objective:     Vital Signs (Most Recent):  Temp: 98.3 °F (36.8 °C) (10/15/23 0747)  Pulse: 97 (10/15/23 0950)  Resp: 18 (10/15/23 0856)  BP: (!) 153/80 (10/15/23 0747)  SpO2: (!) 94 % (10/15/23 0856) Vital Signs (24h Range):  Temp:  [97.5 °F (36.4 °C)-99.5 °F (37.5 °C)] 98.3 °F (36.8 °C)  Pulse:  [66-97] 97  Resp:  [16-20] 18  SpO2:  [93 %-96 %] 94 %  BP: (119-153)/(62-80) 153/80     Weight: 95 kg (209 lb 7 oz)  Body mass index is 30.05 kg/m².  No intake or output data in the 24 hours ending 10/15/23 1053      Physical Exam  Vitals and nursing note reviewed.   Constitutional:       General: He is not in acute distress.     Appearance: He is not ill-appearing.   Cardiovascular:      Rate and Rhythm: Normal rate and regular rhythm.      Heart sounds: No murmur heard.     No friction rub. No gallop.   Pulmonary:      Comments: Diminished breath sounds, faint crackles at LLB. No increased work of breathing on room air   Abdominal:      General: Bowel sounds are normal. There is no distension.      Palpations: Abdomen is soft.      Tenderness: There is no abdominal tenderness. There is no guarding or rebound.   Musculoskeletal:      Right lower leg: No edema.      Left lower leg: No edema.   Neurological:      Mental Status: He is alert and oriented to person, place, and time. Mental status is at baseline.             Significant Labs: All pertinent labs within the past 24 hours have been reviewed.    Significant Imaging: I have reviewed all pertinent imaging results/findings within the  past 24 hours.      Assessment/Plan:      * Spontaneous pneumothorax  - unclear etiology. Pt is a non-smoker, no COPD/emphysema, no recent trauma/injuries.   - s/p L chest tube placement in the ED, currently to wall suction   - CT surgery consulted; defer management   - Chest tube exchanged to larger bore on 10/14 CTS with decrease in size of PTX on plain film   - CT surgery plan to decrease suction to -20 today  - Monitor continuous pulse ox   - Monitor daily CXR   - Add Guaifenesin Prn for cough   - scheduled toradol added per CT surgery for pain   - Pain control with PO tramadol and IV morphine low dose as needed for breakthrough pain       Pericardial cyst  - noted incidentally on CT imaging of the chest    - Cardiothoracic surgery following; discussed with Dr. Knight- he recommends no surgical drainage or intervention at this time as pt is not having symptoms from cyst and has no signs of infection         Coronary artery calcification seen on CT scan  PT without chest tightness at this time, shortness of breath resolved after chest tube placement. Initial trops neg x 2, EKG with no acute ischemic changes  Outpatient referral to cardiology for further management       Hyperlipidemia  - continue home statin       Essential hypertension, benign  - continue home ARB  - monitor BP   - IV hydralazine as needed       Pre-diabetes  - hold home Janumet  - monitor BG with BMP   - hypoglycemia protocol       VTE Risk Mitigation (From admission, onward)         Ordered     heparin (porcine) injection 5,000 Units  Every 8 hours         10/15/23 1102     Reason for No Pharmacological VTE Prophylaxis  Once        Question:  Reasons:  Answer:  Physician Provided (leave comment)  Comment:  possible procedural intervention    10/13/23 1620     IP VTE HIGH RISK PATIENT  Once         10/13/23 1620     Place sequential compression device  Until discontinued         10/13/23 1620                Discharge Planning   FRANCES:      Code  Status: Full Code   Is the patient medically ready for discharge?: No    Reason for patient still in hospital (select all that apply): Patient trending condition, Treatment and Consult recommendations  Discharge Plan A: Home with family                  Mariola Caldwell MD  Department of Hospital Medicine   [facility] - Telemetry ([facility])

## 2023-10-15 NOTE — SUBJECTIVE & OBJECTIVE
Interval History: NAEON. Pt seen with wife at bedside. Reports no CP, SOB. Has soreness at chest tube site. CXR this AM shows stable tiny L PTX.     Review of Systems  Objective:     Vital Signs (Most Recent):  Temp: 98.3 °F (36.8 °C) (10/15/23 0747)  Pulse: 97 (10/15/23 0950)  Resp: 18 (10/15/23 0856)  BP: (!) 153/80 (10/15/23 0747)  SpO2: (!) 94 % (10/15/23 0856) Vital Signs (24h Range):  Temp:  [97.5 °F (36.4 °C)-99.5 °F (37.5 °C)] 98.3 °F (36.8 °C)  Pulse:  [66-97] 97  Resp:  [16-20] 18  SpO2:  [93 %-96 %] 94 %  BP: (119-153)/(62-80) 153/80     Weight: 95 kg (209 lb 7 oz)  Body mass index is 30.05 kg/m².  No intake or output data in the 24 hours ending 10/15/23 1053      Physical Exam  Vitals and nursing note reviewed.   Constitutional:       General: He is not in acute distress.     Appearance: He is not ill-appearing.   Cardiovascular:      Rate and Rhythm: Normal rate and regular rhythm.      Heart sounds: No murmur heard.     No friction rub. No gallop.   Pulmonary:      Comments: Diminished breath sounds, faint crackles at LLB. No increased work of breathing on room air   Abdominal:      General: Bowel sounds are normal. There is no distension.      Palpations: Abdomen is soft.      Tenderness: There is no abdominal tenderness. There is no guarding or rebound.   Musculoskeletal:      Right lower leg: No edema.      Left lower leg: No edema.   Neurological:      Mental Status: He is alert and oriented to person, place, and time. Mental status is at baseline.             Significant Labs: All pertinent labs within the past 24 hours have been reviewed.    Significant Imaging: I have reviewed all pertinent imaging results/findings within the past 24 hours.

## 2023-10-15 NOTE — ASSESSMENT & PLAN NOTE
- unclear etiology. Pt is a non-smoker, no COPD/emphysema, no recent trauma/injuries.   - s/p L chest tube placement in the ED, currently to wall suction   - CT surgery consulted; defer management   - Chest tube exchanged to larger bore on 10/14 CTS with decrease in size of PTX on plain film   - CT surgery plan to decrease suction to -20 today  - Monitor continuous pulse ox   - Monitor daily CXR   - Add Guaifenesin Prn for cough   - scheduled toradol added per CT surgery for pain   - Pain control with PO tramadol and IV morphine low dose as needed for breakthrough pain

## 2023-10-15 NOTE — NURSING
MD to pts bedside   MD placed CT to - 20 cm suction at this time   He advised to change CT drg with 4x4 and tegaderm. Drg changed and with out complication

## 2023-10-15 NOTE — ASSESSMENT & PLAN NOTE
The patient is a 68-year-old male who was admitted with spontaneous large left pneumothorax.  Patient had a pigtail catheter placed with re-expansion of left lung.  No air leak in Pleur-evac.  Will repeat chest x-ray in the morning.  Obtain CT scan of the chest to rule out primary lung pathology.    10/15/2023   The patient is status post large bore chest tube placement yesterday due to a persistent pneumothorax.  Left lung is better in plated on today's x-ray.  Continue chest tube to suction.  No air leak observed in Pleur-evac.  Plan on pain patient on water seal if no no further air leaks in the next 24 hours.  Patient's pain is well controlled with current pain management.

## 2023-10-15 NOTE — ASSESSMENT & PLAN NOTE
- noted incidentally on CT imaging of the chest    - Cardiothoracic surgery following; discussed with Dr. Knight- he recommends no surgical drainage or intervention at this time as pt is not having symptoms from cyst and has no signs of infection

## 2023-10-15 NOTE — HOSPITAL COURSE
10/15/2023   The patient is admitted for management of left pneumothorax.    10/16/2023   The patient is admitted for management of left pneumothorax.

## 2023-10-15 NOTE — SUBJECTIVE & OBJECTIVE
Interval History:  Patient is admitted for management of left pneumothorax.    ROS  Medications:  Continuous Infusions:  Scheduled Meds:   acetaminophen  650 mg Oral Q6H    atorvastatin  10 mg Oral Daily    heparin (porcine)  5,000 Units Subcutaneous Q8H    ketorolac  15 mg Intravenous Q8H    senna-docusate 8.6-50 mg  1 tablet Oral BID    valsartan  320 mg Oral Daily     PRN Meds:acetaminophen, acetaminophen, albuterol-ipratropium, dextrose 10%, dextrose 10%, glucagon (human recombinant), glucose, glucose, guaiFENesin 100 mg/5 ml, hydrALAZINE, melatonin, morphine, naloxone, ondansetron, polyethylene glycol, sodium chloride 0.9%, traMADoL     Objective:     Vital Signs (Most Recent):  Temp: 97.2 °F (36.2 °C) (10/15/23 1123)  Pulse: 80 (10/15/23 1123)  Resp: 18 (10/15/23 1123)  BP: (!) 151/74 (10/15/23 1123)  SpO2: 96 % (10/15/23 1123) Vital Signs (24h Range):  Temp:  [97.2 °F (36.2 °C)-99.5 °F (37.5 °C)] 97.2 °F (36.2 °C)  Pulse:  [66-97] 80  Resp:  [16-20] 18  SpO2:  [93 %-96 %] 96 %  BP: (125-153)/(67-80) 151/74     Weight: 95 kg (209 lb 7 oz)  Body mass index is 30.05 kg/m².    SpO2: 96 %       Intake/Output - Last 3 Shifts         10/13 0700  10/14 0659 10/14 0700  10/15 0659 10/15 0700  10/16 0659    P.O. 200  360    Total Intake(mL/kg) 200 (2.1)  360 (3.8)    Urine (mL/kg/hr)   400 (0.9)    Chest Tube 47      Total Output 47  400    Net +153  -40           Urine Occurrence 1 x              Lines/Drains/Airways       Drain  Duration                  Chest Tube 10/13/23 1444 Tube - 1 Left Midaxillary;Fourth intercostal space 8 Fr. 1 day              Peripheral Intravenous Line  Duration                  Peripheral IV - Single Lumen 10/13/23 1408 20 G Left Antecubital 1 day                     Physical Exam  Constitutional:       Appearance: Normal appearance.   HENT:      Head: Normocephalic and atraumatic.   Cardiovascular:      Heart sounds: Normal heart sounds.   Abdominal:      General: Abdomen is flat.       Palpations: Abdomen is soft.   Skin:     General: Skin is warm and dry.   Neurological:      Mental Status: He is alert and oriented to person, place, and time.   Psychiatric:         Behavior: Behavior normal.            Significant Labs:  All pertinent labs from the last 24 hours have been reviewed.    Significant Diagnostics:  I have reviewed all pertinent imaging results/findings within the past 24 hours.

## 2023-10-16 PROCEDURE — 63600175 PHARM REV CODE 636 W HCPCS: Performed by: THORACIC SURGERY (CARDIOTHORACIC VASCULAR SURGERY)

## 2023-10-16 PROCEDURE — 63600175 PHARM REV CODE 636 W HCPCS: Performed by: INTERNAL MEDICINE

## 2023-10-16 PROCEDURE — 21400001 HC TELEMETRY ROOM

## 2023-10-16 PROCEDURE — 25000003 PHARM REV CODE 250: Performed by: INTERNAL MEDICINE

## 2023-10-16 PROCEDURE — 94761 N-INVAS EAR/PLS OXIMETRY MLT: CPT

## 2023-10-16 PROCEDURE — 25000003 PHARM REV CODE 250: Performed by: THORACIC SURGERY (CARDIOTHORACIC VASCULAR SURGERY)

## 2023-10-16 RX ADMIN — KETOROLAC TROMETHAMINE 15 MG: 30 INJECTION, SOLUTION INTRAMUSCULAR; INTRAVENOUS at 05:10

## 2023-10-16 RX ADMIN — VALSARTAN 320 MG: 160 TABLET, FILM COATED ORAL at 08:10

## 2023-10-16 RX ADMIN — ACETAMINOPHEN 650 MG: 325 TABLET ORAL at 05:10

## 2023-10-16 RX ADMIN — SENNOSIDES AND DOCUSATE SODIUM 1 TABLET: 8.6; 5 TABLET ORAL at 08:10

## 2023-10-16 RX ADMIN — KETOROLAC TROMETHAMINE 15 MG: 30 INJECTION, SOLUTION INTRAMUSCULAR; INTRAVENOUS at 09:10

## 2023-10-16 RX ADMIN — HEPARIN SODIUM 5000 UNITS: 5000 INJECTION INTRAVENOUS; SUBCUTANEOUS at 09:10

## 2023-10-16 RX ADMIN — ACETAMINOPHEN 650 MG: 325 TABLET ORAL at 11:10

## 2023-10-16 RX ADMIN — SENNOSIDES AND DOCUSATE SODIUM 1 TABLET: 8.6; 5 TABLET ORAL at 09:10

## 2023-10-16 RX ADMIN — HEPARIN SODIUM 5000 UNITS: 5000 INJECTION INTRAVENOUS; SUBCUTANEOUS at 02:10

## 2023-10-16 RX ADMIN — KETOROLAC TROMETHAMINE 15 MG: 30 INJECTION, SOLUTION INTRAMUSCULAR; INTRAVENOUS at 02:10

## 2023-10-16 RX ADMIN — ATORVASTATIN CALCIUM 10 MG: 10 TABLET, FILM COATED ORAL at 08:10

## 2023-10-16 RX ADMIN — HEPARIN SODIUM 5000 UNITS: 5000 INJECTION INTRAVENOUS; SUBCUTANEOUS at 05:10

## 2023-10-16 NOTE — ASSESSMENT & PLAN NOTE
The patient is a 68-year-old male who was admitted with spontaneous large left pneumothorax.  Patient had a pigtail catheter placed with re-expansion of left lung.  No air leak in Pleur-evac.  Will repeat chest x-ray in the morning.  Obtain CT scan of the chest to rule out primary lung pathology.    10/15/2023   The patient is status post large bore chest tube placement yesterday due to a persistent pneumothorax.  Left lung is better in plated on today's x-ray.  Continue chest tube to suction.  No air leak observed in Pleur-evac.  Plan on pain patient on water seal if no no further air leaks in the next 24 hours.  Patient's pain is well controlled with current pain management.    10/16/2023   The patient is status post large bore chest tube placement for pneumothorax.  Chest x-ray today shows no pneumothorax.  Chest tube placed on Pleur-evac.  Will clamp chest tube if no recurrent pneumothorax after about 12 hours.

## 2023-10-16 NOTE — SUBJECTIVE & OBJECTIVE
Interval History: NAEON. Chest tube was placed to waterseal this AM per CT surgery. Pt seen with wife at bedside. He has no complaints today outside of soreness at L chest incision site. Denies chest tightness, shortness of breath, cough. + bowel movement     Review of Systems  Objective:     Vital Signs (Most Recent):  Temp: 98.2 °F (36.8 °C) (10/16/23 0703)  Pulse: 82 (10/16/23 0943)  Resp: 18 (10/16/23 0747)  BP: 135/74 (10/16/23 0703)  SpO2: 96 % (10/16/23 0747) Vital Signs (24h Range):  Temp:  [97.8 °F (36.6 °C)-99.2 °F (37.3 °C)] 98.2 °F (36.8 °C)  Pulse:  [67-88] 82  Resp:  [17-20] 18  SpO2:  [91 %-97 %] 96 %  BP: (124-138)/(66-74) 135/74     Weight: 95 kg (209 lb 7 oz)  Body mass index is 30.05 kg/m².    Intake/Output Summary (Last 24 hours) at 10/16/2023 1145  Last data filed at 10/16/2023 0830  Gross per 24 hour   Intake 500 ml   Output 553 ml   Net -53 ml         Physical Exam  Vitals and nursing note reviewed.   Constitutional:       General: He is not in acute distress.     Appearance: He is obese. He is not ill-appearing.   Cardiovascular:      Rate and Rhythm: Normal rate and regular rhythm.      Heart sounds: No murmur heard.     No friction rub. No gallop.   Pulmonary:      Breath sounds: No wheezing or rhonchi.      Comments: Faint crackles to LLB, no increased work of breathing, on room air   Abdominal:      General: Bowel sounds are normal. There is no distension.      Palpations: Abdomen is soft.      Tenderness: There is no abdominal tenderness. There is no guarding or rebound.   Musculoskeletal:      Right lower leg: No edema.      Left lower leg: No edema.   Skin:     General: Skin is warm and dry.      Comments: L chest chest tube dressing CDI    Neurological:      Mental Status: He is alert and oriented to person, place, and time. Mental status is at baseline.             Significant Labs: All pertinent labs within the past 24 hours have been reviewed.    Significant Imaging: I have  reviewed all pertinent imaging results/findings within the past 24 hours.

## 2023-10-16 NOTE — ASSESSMENT & PLAN NOTE
- unclear etiology. Pt is a non-smoker, no COPD/emphysema, no recent trauma/injuries.   - s/p L chest tube placement in the ED  - CT surgery consulted; defer management   - Chest tube exchanged to larger bore on 10/14 CTS   - chest tube placed to waterseal 10/16 per Dr. Knight   - Monitor continuous pulse ox   - Monitor daily CXR   - Continue Guaifenesin Prn for cough   - scheduled toradol per CT surgery for pain   - Pain control with PO tramadol and IV morphine low dose as needed for breakthrough pain

## 2023-10-16 NOTE — PROGRESS NOTES
[facility] ([facility] Medicine  Progress Note    Patient Name: Brayan Mills  MRN: [id number]  Patient Class: IP- Inpatient   Admission Date: 10/13/2023  Length of Stay: 3 days  Attending Physician: Mariola Caldwell MD  Primary Care Provider: Maria Ines Bingham MD        Subjective:     Principal Problem:Spontaneous pneumothorax        HPI:  Pt is a 67 YO  male with PMH notable for essential HTN, HLD, pre-DM, prostate ca (s/p prostatectomy) who presented to the ED on 10/13/23 for evaluation of L sided chest pain and shortness of breath. Pt reports he was in his usual state of health until approx 7 am on morning of admission when he began feeling fatigued, short of breath and had vague left sided chest discomfort. Pt reports he took some Advil which did not help the pain. Went to physical therapy for this shoulder but became fatigued and short of breath with activity which was unusual for him. Denies any prior similar episodes. Reports associated intermittent dry cough. Per patient's daughter, his wife noticed dry cough a few days ago but more pronounced this AM. In the ED, initial VS: temp 99.1, HR 95, RR 16, /67, sats 95% on RA. Work up notable for CXR with L tension pneumothorax. Labs fairly unremarkable. EKG NSR, trop and BNP negative. L sided chest tube was placed by the ED provider, CT surgery Dr. Knight consulted for chest tube management. Subsequent CXR showed near resolution of L PTX. Hospital medicine consulted for admission.     Pt reports he is very active at baseline. Denies any recent illness, trauma, falls, straining or injuries. He is not a smoker, denies any prior hx of COPD/asthma/emphysema. Reports CP and dyspnea improved since placement of chest tube.       Overview/Hospital Course:  Pt admitted to hospital medicine. CT surgery Dr. Knight following for chest tube management. CT chest done to eval PTX and possible underlying lung pathology was notable  Detail Level: Simple for pericardial cyst. Discussed with Dr. Knight- he recommends no surgical intervention for pericardial cyst as pt does not have any symptoms from it and does not have any signs of infection. Chest tube was changed to larger bore chest tube on 10/14 by Dr. Knight      Interval History: NAEON. Chest tube was placed to waterseal this AM per CT surgery. Pt seen with wife at bedside. He has no complaints today outside of soreness at L chest incision site. Denies chest tightness, shortness of breath, cough. + bowel movement     Review of Systems  Objective:     Vital Signs (Most Recent):  Temp: 98.2 °F (36.8 °C) (10/16/23 0703)  Pulse: 82 (10/16/23 0943)  Resp: 18 (10/16/23 0747)  BP: 135/74 (10/16/23 0703)  SpO2: 96 % (10/16/23 0747) Vital Signs (24h Range):  Temp:  [97.8 °F (36.6 °C)-99.2 °F (37.3 °C)] 98.2 °F (36.8 °C)  Pulse:  [67-88] 82  Resp:  [17-20] 18  SpO2:  [91 %-97 %] 96 %  BP: (124-138)/(66-74) 135/74     Weight: 95 kg (209 lb 7 oz)  Body mass index is 30.05 kg/m².    Intake/Output Summary (Last 24 hours) at 10/16/2023 1145  Last data filed at 10/16/2023 0830  Gross per 24 hour   Intake 500 ml   Output 553 ml   Net -53 ml         Physical Exam  Vitals and nursing note reviewed.   Constitutional:       General: He is not in acute distress.     Appearance: He is obese. He is not ill-appearing.   Cardiovascular:      Rate and Rhythm: Normal rate and regular rhythm.      Heart sounds: No murmur heard.     No friction rub. No gallop.   Pulmonary:      Breath sounds: No wheezing or rhonchi.      Comments: Faint crackles to LLB, no increased work of breathing, on room air   Abdominal:      General: Bowel sounds are normal. There is no distension.      Palpations: Abdomen is soft.      Tenderness: There is no abdominal tenderness. There is no guarding or rebound.   Musculoskeletal:      Right lower leg: No edema.      Left lower leg: No edema.   Skin:     General: Skin is warm and dry.      Comments: L chest chest  tube dressing CDI    Neurological:      Mental Status: He is alert and oriented to person, place, and time. Mental status is at baseline.             Significant Labs: All pertinent labs within the past 24 hours have been reviewed.    Significant Imaging: I have reviewed all pertinent imaging results/findings within the past 24 hours.      Assessment/Plan:      * Spontaneous pneumothorax  - unclear etiology. Pt is a non-smoker, no COPD/emphysema, no recent trauma/injuries.   - s/p L chest tube placement in the ED  - CT surgery consulted; defer management   - Chest tube exchanged to larger bore on 10/14 CTS   - chest tube placed to waterseal 10/16 per Dr. Knight   - Monitor continuous pulse ox   - Monitor daily CXR   - Continue Guaifenesin Prn for cough   - scheduled toradol per CT surgery for pain   - Pain control with PO tramadol and IV morphine low dose as needed for breakthrough pain       Pericardial cyst  - noted incidentally on CT imaging of the chest    - Cardiothoracic surgery following; discussed with Dr. Knight- he recommends no surgical drainage or intervention at this time as pt is not having symptoms from cyst and has no signs of infection         Coronary artery calcification seen on CT scan  PT without chest tightness at this time, shortness of breath resolved after chest tube placement. Initial trops neg x 2, EKG with no acute ischemic changes  Outpatient referral to cardiology for further management       Hyperlipidemia  - continue home statin       Essential hypertension, benign  - continue home ARB  - monitor BP   - IV hydralazine as needed       Pre-diabetes  - hold home Janumet  - monitor BG with BMP   - hypoglycemia protocol         VTE Risk Mitigation (From admission, onward)         Ordered     heparin (porcine) injection 5,000 Units  Every 8 hours         10/15/23 1102     Reason for No Pharmacological VTE Prophylaxis  Once        Question:  Reasons:  Answer:  Physician Provided (leave  comment)  Comment:  possible procedural intervention    10/13/23 1620     IP VTE HIGH RISK PATIENT  Once         10/13/23 1620     Place sequential compression device  Until discontinued         10/13/23 1620                Discharge Planning   FRANCES:      Code Status: Full Code   Is the patient medically ready for discharge?: No    Reason for patient still in hospital (select all that apply): Patient trending condition, Treatment, Imaging and Consult recommendations  Discharge Plan A: Home with family                  Mariola Caldwell MD  Department of Hospital Medicine   [facility] [facility] Tele[facility] ([facility])   Detail Level: Detailed

## 2023-10-16 NOTE — PLAN OF CARE
Problem: Adult Inpatient Plan of Care  Goal: Patient-Specific Goal (Individualized)  Outcome: Ongoing, Progressing        Pt AAO  Vitals stable   Afebrile   Saturating well on room air   Continuous pulse ox at bedside   L chest tube to room air       CXR at 1700  Pt ambulated several times today out in [place]    Pt independent   Fall precautions in place      24 hour chart check completed

## 2023-10-16 NOTE — CHAPLAIN
Initial visit with patient and his family.  Visited with patient and his family to assess for spiritual and emotional needs.  Pt and his family were doing well.  Pt spent time talking about his health and how he is feeling at this time.  He mentioned that he is doing so much better and is very thankful for all the help of all the staff so far.  Pt and his family wanted me to pray and I took time to do this before leaving and spiritual care remains available as needed.    Chaplain Clarence Perez M.Div., [facility]

## 2023-10-16 NOTE — SUBJECTIVE & OBJECTIVE
Interval History:  Admitted for left pneumothorax.    ROS  Medications:  Continuous Infusions:  Scheduled Meds:   acetaminophen  650 mg Oral Q6H    atorvastatin  10 mg Oral Daily    heparin (porcine)  5,000 Units Subcutaneous Q8H    ketorolac  15 mg Intravenous Q8H    senna-docusate 8.6-50 mg  1 tablet Oral BID    valsartan  320 mg Oral Daily     PRN Meds:acetaminophen, acetaminophen, albuterol-ipratropium, dextrose 10%, dextrose 10%, glucagon (human recombinant), glucose, glucose, guaiFENesin 100 mg/5 ml, hydrALAZINE, melatonin, morphine, naloxone, ondansetron, polyethylene glycol, sodium chloride 0.9%, traMADoL     Objective:     Vital Signs (Most Recent):  Temp: 98.2 °F (36.8 °C) (10/16/23 0703)  Pulse: 73 (10/16/23 0703)  Resp: 18 (10/16/23 0747)  BP: 135/74 (10/16/23 0703)  SpO2: 96 % (10/16/23 0747) Vital Signs (24h Range):  Temp:  [97.2 °F (36.2 °C)-99.2 °F (37.3 °C)] 98.2 °F (36.8 °C)  Pulse:  [67-97] 73  Resp:  [17-20] 18  SpO2:  [91 %-97 %] 96 %  BP: (124-151)/(66-74) 135/74     Weight: 95 kg (209 lb 7 oz)  Body mass index is 30.05 kg/m².    SpO2: 96 %       Intake/Output - Last 3 Shifts         10/14 0700  10/15 0659 10/15 0700  10/16 0659 10/16 0700  10/17 0659    P.O.  620     Total Intake(mL/kg)  620 (6.5)     Urine (mL/kg/hr)  900 (0.4)     Chest Tube  35 18    Total Output  935 18    Net  -315 -18                   Lines/Drains/Airways       Drain  Duration                  Chest Tube 10/13/23 1444 Tube - 1 Left Midaxillary;Fourth intercostal space 8 Fr. 2 days              Peripheral Intravenous Line  Duration                  Peripheral IV - Single Lumen 10/13/23 1408 20 G Left Antecubital 2 days                     Physical Exam  Constitutional:       Appearance: Normal appearance.   HENT:      Head: Normocephalic and atraumatic.   Cardiovascular:      Rate and Rhythm: Normal rate and regular rhythm.      Heart sounds: Normal heart sounds.   Pulmonary:      Effort: Pulmonary effort is normal.       Breath sounds: Normal breath sounds.   Abdominal:      General: Abdomen is flat. Bowel sounds are normal.      Palpations: Abdomen is soft.   Musculoskeletal:      Right lower leg: No edema.      Left lower leg: No edema.   Skin:     General: Skin is warm and dry.   Neurological:      Mental Status: He is alert and oriented to person, place, and time.   Psychiatric:         Behavior: Behavior normal.            Significant Labs:  All pertinent labs from the last 24 hours have been reviewed.    Significant Diagnostics:  I have reviewed all pertinent imaging results/findings within the past 24 hours.

## 2023-10-17 LAB
ANION GAP SERPL CALC-SCNC: 10 MMOL/L (ref 8–16)
BASOPHILS # BLD AUTO: 0.03 K/UL (ref 0–0.2)
BASOPHILS NFR BLD: 0.5 % (ref 0–1.9)
BUN SERPL-MCNC: 19 MG/DL (ref 8–23)
CALCIUM SERPL-MCNC: 8.6 MG/DL (ref 8.7–10.5)
CHLORIDE SERPL-SCNC: 107 MMOL/L (ref 95–110)
CO2 SERPL-SCNC: 23 MMOL/L (ref 23–29)
CREAT SERPL-MCNC: 0.9 MG/DL (ref 0.5–1.4)
DIFFERENTIAL METHOD: ABNORMAL
EOSINOPHIL # BLD AUTO: 0.7 K/UL (ref 0–0.5)
EOSINOPHIL NFR BLD: 11.6 % (ref 0–8)
ERYTHROCYTE [DISTWIDTH] IN BLOOD BY AUTOMATED COUNT: 13.3 % (ref 11.5–14.5)
EST. GFR  (NO RACE VARIABLE): >60 ML/MIN/1.73 M^2
GLUCOSE SERPL-MCNC: 96 MG/DL (ref 70–110)
HCT VFR BLD AUTO: 39.1 % (ref 40–54)
HGB BLD-MCNC: 13.2 G/DL (ref 14–18)
IMM GRANULOCYTES # BLD AUTO: 0.02 K/UL (ref 0–0.04)
IMM GRANULOCYTES NFR BLD AUTO: 0.3 % (ref 0–0.5)
LYMPHOCYTES # BLD AUTO: 1.4 K/UL (ref 1–4.8)
LYMPHOCYTES NFR BLD: 22.5 % (ref 18–48)
MCH RBC QN AUTO: 32 PG (ref 27–31)
MCHC RBC AUTO-ENTMCNC: 33.8 G/DL (ref 32–36)
MCV RBC AUTO: 95 FL (ref 82–98)
MONOCYTES # BLD AUTO: 0.5 K/UL (ref 0.3–1)
MONOCYTES NFR BLD: 8.3 % (ref 4–15)
NEUTROPHILS # BLD AUTO: 3.6 K/UL (ref 1.8–7.7)
NEUTROPHILS NFR BLD: 56.8 % (ref 38–73)
NRBC BLD-RTO: 0 /100 WBC
PLATELET # BLD AUTO: 251 K/UL (ref 150–450)
PMV BLD AUTO: 9.2 FL (ref 9.2–12.9)
POTASSIUM SERPL-SCNC: 4.4 MMOL/L (ref 3.5–5.1)
RBC # BLD AUTO: 4.12 M/UL (ref 4.6–6.2)
SODIUM SERPL-SCNC: 140 MMOL/L (ref 136–145)
WBC # BLD AUTO: 6.3 K/UL (ref 3.9–12.7)

## 2023-10-17 PROCEDURE — 21400001 HC TELEMETRY ROOM

## 2023-10-17 PROCEDURE — 85025 COMPLETE CBC W/AUTO DIFF WBC: CPT | Performed by: INTERNAL MEDICINE

## 2023-10-17 PROCEDURE — 80048 BASIC METABOLIC PNL TOTAL CA: CPT | Performed by: INTERNAL MEDICINE

## 2023-10-17 PROCEDURE — 25000003 PHARM REV CODE 250: Performed by: INTERNAL MEDICINE

## 2023-10-17 PROCEDURE — 25000003 PHARM REV CODE 250: Performed by: THORACIC SURGERY (CARDIOTHORACIC VASCULAR SURGERY)

## 2023-10-17 PROCEDURE — 63600175 PHARM REV CODE 636 W HCPCS: Performed by: THORACIC SURGERY (CARDIOTHORACIC VASCULAR SURGERY)

## 2023-10-17 PROCEDURE — 63600175 PHARM REV CODE 636 W HCPCS: Performed by: INTERNAL MEDICINE

## 2023-10-17 PROCEDURE — 36415 COLL VENOUS BLD VENIPUNCTURE: CPT | Performed by: INTERNAL MEDICINE

## 2023-10-17 RX ADMIN — HEPARIN SODIUM 5000 UNITS: 5000 INJECTION INTRAVENOUS; SUBCUTANEOUS at 05:10

## 2023-10-17 RX ADMIN — SENNOSIDES AND DOCUSATE SODIUM 1 TABLET: 8.6; 5 TABLET ORAL at 09:10

## 2023-10-17 RX ADMIN — KETOROLAC TROMETHAMINE 15 MG: 30 INJECTION, SOLUTION INTRAMUSCULAR; INTRAVENOUS at 01:10

## 2023-10-17 RX ADMIN — VALSARTAN 320 MG: 160 TABLET, FILM COATED ORAL at 08:10

## 2023-10-17 RX ADMIN — ATORVASTATIN CALCIUM 10 MG: 10 TABLET, FILM COATED ORAL at 08:10

## 2023-10-17 RX ADMIN — ACETAMINOPHEN 650 MG: 325 TABLET ORAL at 12:10

## 2023-10-17 RX ADMIN — KETOROLAC TROMETHAMINE 15 MG: 30 INJECTION, SOLUTION INTRAMUSCULAR; INTRAVENOUS at 05:10

## 2023-10-17 RX ADMIN — HEPARIN SODIUM 5000 UNITS: 5000 INJECTION INTRAVENOUS; SUBCUTANEOUS at 09:10

## 2023-10-17 RX ADMIN — ACETAMINOPHEN 650 MG: 325 TABLET ORAL at 05:10

## 2023-10-17 RX ADMIN — HEPARIN SODIUM 5000 UNITS: 5000 INJECTION INTRAVENOUS; SUBCUTANEOUS at 01:10

## 2023-10-17 NOTE — PLAN OF CARE
Updated patient on plan of care. Chest tube removed by MD. Dressing CDI, no SOB noted. Instructed patient to use call light for assistance, call light in reach. Hourly rounding performed. Vitals q4 hours. Education provided, questions answered/encouraged. Chart check complete. Sinus rhythm on tele box #[id number]    Problem: Adult Inpatient Plan of Care  Goal: Plan of Care Review  Outcome: Ongoing, Progressing

## 2023-10-17 NOTE — SUBJECTIVE & OBJECTIVE
Interval History: NAEON. Pt seen with wife. He has no complaints today. Denies CP, SOB. Chest tube remains to waterseal, AM cxr shows no PTX, LLB atelectasis.     Review of Systems  Objective:     Vital Signs (Most Recent):  Temp: 99.2 °F (37.3 °C) (10/17/23 1150)  Pulse: 64 (10/17/23 1150)  Resp: 18 (10/17/23 1150)  BP: (!) 164/74 (10/17/23 1150)  SpO2: (!) 93 % (10/17/23 1150) Vital Signs (24h Range):  Temp:  [97.5 °F (36.4 °C)-99.2 °F (37.3 °C)] 99.2 °F (37.3 °C)  Pulse:  [64-88] 64  Resp:  [16-18] 18  SpO2:  [93 %-98 %] 93 %  BP: (125-164)/(63-74) 164/74     Weight: 96.9 kg (213 lb 10 oz)  Body mass index is 30.65 kg/m².    Intake/Output Summary (Last 24 hours) at 10/17/2023 1336  Last data filed at 10/17/2023 0850  Gross per 24 hour   Intake 600 ml   Output --   Net 600 ml         Physical Exam  Vitals and nursing note reviewed.   Constitutional:       General: He is not in acute distress.     Appearance: He is obese. He is not ill-appearing.   Cardiovascular:      Rate and Rhythm: Normal rate and regular rhythm.      Heart sounds: No murmur heard.     No friction rub. No gallop.      Comments: L chest tube in place   Pulmonary:      Comments: Crackles to LLB, on room air, no increased work of breathing   Abdominal:      General: Bowel sounds are normal. There is no distension.      Palpations: Abdomen is soft.      Tenderness: There is no abdominal tenderness. There is no guarding or rebound.   Musculoskeletal:      Right lower leg: No edema.      Left lower leg: No edema.   Neurological:      Mental Status: He is alert and oriented to person, place, and time. Mental status is at baseline.   Psychiatric:         Mood and Affect: Mood normal.         Behavior: Behavior normal.             Significant Labs: All pertinent labs within the past 24 hours have been reviewed.    Significant Imaging: I have reviewed all pertinent imaging results/findings within the past 24 hours.

## 2023-10-17 NOTE — ASSESSMENT & PLAN NOTE
- unclear etiology. Pt is a non-smoker, no COPD/emphysema, no recent trauma/injuries.   - s/p L chest tube placement in the ED  - CT surgery consulted; defer management   - Chest tube exchanged to larger bore on 10/14 CTS   - chest tube placed to waterseal 10/16. Discussed with Dr. Knight - plan to remove chest tube today, repeat AM CXR   - Monitor continuous pulse ox   - Monitor daily CXR   - Continue Guaifenesin Prn for cough   - scheduled toradol per CT surgery for pain   - Pain control with PO tramadol as needed, stop IV Morphine

## 2023-10-17 NOTE — PLAN OF CARE
Problem: Adult Inpatient Plan of Care  Goal: Plan of Care Review  Outcome: Ongoing, Progressing     Problem: Gas Exchange Impaired  Goal: Optimal Gas Exchange  Outcome: Ongoing, Progressing     Problem: Respiratory Compromise (Pneumothorax)  Goal: Optimal Oxygenation and Ventilation  Outcome: Ongoing, Progressing

## 2023-10-17 NOTE — PROGRESS NOTES
[facility] ([facility] Medicine  Progress Note    Patient Name: Brayan Mills  MRN: [id number]  Patient Class: IP- Inpatient   Admission Date: 10/13/2023  Length of Stay: 4 days  Attending Physician: Mariola Caldwell MD  Primary Care Provider: Maria Ines Bingham MD        Subjective:     Principal Problem:Spontaneous pneumothorax        HPI:  Pt is a 69 YO  male with PMH notable for essential HTN, HLD, pre-DM, prostate ca (s/p prostatectomy) who presented to the ED on 10/13/23 for evaluation of L sided chest pain and shortness of breath. Pt reports he was in his usual state of health until approx 7 am on morning of admission when he began feeling fatigued, short of breath and had vague left sided chest discomfort. Pt reports he took some Advil which did not help the pain. Went to physical therapy for this shoulder but became fatigued and short of breath with activity which was unusual for him. Denies any prior similar episodes. Reports associated intermittent dry cough. Per patient's daughter, his wife noticed dry cough a few days ago but more pronounced this AM. In the ED, initial VS: temp 99.1, HR 95, RR 16, /67, sats 95% on RA. Work up notable for CXR with L tension pneumothorax. Labs fairly unremarkable. EKG NSR, trop and BNP negative. L sided chest tube was placed by the ED provider, CT surgery Dr. Knight consulted for chest tube management. Subsequent CXR showed near resolution of L PTX. Hospital medicine consulted for admission.     Pt reports he is very active at baseline. Denies any recent illness, trauma, falls, straining or injuries. He is not a smoker, denies any prior hx of COPD/asthma/emphysema. Reports CP and dyspnea improved since placement of chest tube.       Overview/Hospital Course:  Pt admitted to hospital medicine. CT surgery Dr. Knight following for chest tube management. CT chest done to eval PTX and possible underlying lung pathology was notable  for pericardial cyst. Discussed with Dr. Knight- he recommends no surgical intervention for pericardial cyst as pt does not have any symptoms from it and does not have any signs of infection. Chest tube was changed to larger bore chest tube on 10/14 by Dr. Knight. Interval improvement in pneumothorax. Chest tube  placed to waterseal on 10/16/23.       Interval History: NAEON. Pt seen with wife. He has no complaints today. Denies CP, SOB. Chest tube remains to waterseal, AM cxr shows no PTX, LLB atelectasis.     Review of Systems  Objective:     Vital Signs (Most Recent):  Temp: 99.2 °F (37.3 °C) (10/17/23 1150)  Pulse: 64 (10/17/23 1150)  Resp: 18 (10/17/23 1150)  BP: (!) 164/74 (10/17/23 1150)  SpO2: (!) 93 % (10/17/23 1150) Vital Signs (24h Range):  Temp:  [97.5 °F (36.4 °C)-99.2 °F (37.3 °C)] 99.2 °F (37.3 °C)  Pulse:  [64-88] 64  Resp:  [16-18] 18  SpO2:  [93 %-98 %] 93 %  BP: (125-164)/(63-74) 164/74     Weight: 96.9 kg (213 lb 10 oz)  Body mass index is 30.65 kg/m².    Intake/Output Summary (Last 24 hours) at 10/17/2023 1336  Last data filed at 10/17/2023 0850  Gross per 24 hour   Intake 600 ml   Output --   Net 600 ml         Physical Exam  Vitals and nursing note reviewed.   Constitutional:       General: He is not in acute distress.     Appearance: He is obese. He is not ill-appearing.   Cardiovascular:      Rate and Rhythm: Normal rate and regular rhythm.      Heart sounds: No murmur heard.     No friction rub. No gallop.      Comments: L chest tube in place   Pulmonary:      Comments: Crackles to LLB, on room air, no increased work of breathing   Abdominal:      General: Bowel sounds are normal. There is no distension.      Palpations: Abdomen is soft.      Tenderness: There is no abdominal tenderness. There is no guarding or rebound.   Musculoskeletal:      Right lower leg: No edema.      Left lower leg: No edema.   Neurological:      Mental Status: He is alert and oriented to person, place, and time.  Mental status is at baseline.   Psychiatric:         Mood and Affect: Mood normal.         Behavior: Behavior normal.             Significant Labs: All pertinent labs within the past 24 hours have been reviewed.    Significant Imaging: I have reviewed all pertinent imaging results/findings within the past 24 hours.      Assessment/Plan:      * Spontaneous pneumothorax  - unclear etiology. Pt is a non-smoker, no COPD/emphysema, no recent trauma/injuries.   - s/p L chest tube placement in the ED  - CT surgery consulted; defer management   - Chest tube exchanged to larger bore on 10/14 CTS   - chest tube placed to waterseal 10/16. Discussed with Dr. Knight - plan to remove chest tube today, repeat AM CXR   - Monitor continuous pulse ox   - Monitor daily CXR   - Continue Guaifenesin Prn for cough   - scheduled toradol per CT surgery for pain   - Pain control with PO tramadol as needed, stop IV Morphine       Pericardial cyst  - noted incidentally on CT imaging of the chest    - Cardiothoracic surgery following; discussed with Dr. Knight- he recommends no surgical drainage or intervention at this time as pt is not having symptoms from cyst and has no signs of infection         Coronary artery calcification seen on CT scan  PT without chest tightness at this time, shortness of breath resolved after chest tube placement. Initial trops neg x 2, EKG with no acute ischemic changes  Outpatient referral to cardiology for further management       Hyperlipidemia  - continue home statin       Essential hypertension, benign  - continue home ARB  - monitor BP   - IV hydralazine as needed       Pre-diabetes  - hold home Janumet  - monitor BG with BMP   - hypoglycemia protocol         VTE Risk Mitigation (From admission, onward)         Ordered     heparin (porcine) injection 5,000 Units  Every 8 hours         10/15/23 1102     Reason for No Pharmacological VTE Prophylaxis  Once        Question:  Reasons:  Answer:  Physician  Provided (leave comment)  Comment:  possible procedural intervention    10/13/23 1620     IP VTE HIGH RISK PATIENT  Once         10/13/23 1620     Place sequential compression device  Until discontinued         10/13/23 1620                Discharge Planning   FRANCES:      Code Status: Full Code   Is the patient medically ready for discharge?: No    Reason for patient still in hospital (select all that apply): Patient trending condition, Treatment and Consult recommendations  Discharge Plan A: Home with family                  Mariola Caldwell MD  Department of Hospital Medicine   [facility] [facility] Tele[facility] ([facility])

## 2023-10-18 VITALS
DIASTOLIC BLOOD PRESSURE: 74 MMHG | HEART RATE: 76 BPM | HEIGHT: 70 IN | BODY MASS INDEX: 30.33 KG/M2 | OXYGEN SATURATION: 94 % | SYSTOLIC BLOOD PRESSURE: 139 MMHG | WEIGHT: 211.88 LBS | RESPIRATION RATE: 16 BRPM | TEMPERATURE: 98 F

## 2023-10-18 PROCEDURE — 25000003 PHARM REV CODE 250: Performed by: INTERNAL MEDICINE

## 2023-10-18 PROCEDURE — 63600175 PHARM REV CODE 636 W HCPCS: Performed by: INTERNAL MEDICINE

## 2023-10-18 RX ADMIN — VALSARTAN 320 MG: 160 TABLET, FILM COATED ORAL at 09:10

## 2023-10-18 RX ADMIN — ATORVASTATIN CALCIUM 10 MG: 10 TABLET, FILM COATED ORAL at 09:10

## 2023-10-18 RX ADMIN — HEPARIN SODIUM 5000 UNITS: 5000 INJECTION INTRAVENOUS; SUBCUTANEOUS at 05:10

## 2023-10-18 NOTE — DISCHARGE SUMMARY
[facility] ([facility])  [facility] Medicine  Discharge Summary      Patient Name: Brayan Mills  MRN: [id number]  DIA: [id number]  Patient Class: IP- Inpatient  Admission Date: 10/13/2023  Hospital Length of Stay: 5 days  Discharge Date and Time: 10/18/2023 10:06 AM  Attending Physician: Yolanda att. providers found   Discharging Provider: Mariola Caldwell MD  Primary Care Provider: Maria Ines Bingham MD    Primary Care Team: [facility] MEDICINE [facility]    HPI:   Pt is a 67 YO  male with PMH notable for essential HTN, HLD, pre-DM, prostate ca (s/p prostatectomy) who presented to the ED on 10/13/23 for evaluation of L sided chest pain and shortness of breath. Pt reports he was in his usual state of health until approx 7 am on morning of admission when he began feeling fatigued, short of breath and had vague left sided chest discomfort. Pt reports he took some Advil which did not help the pain. Went to physical therapy for this shoulder but became fatigued and short of breath with activity which was unusual for him. Denies any prior similar episodes. Reports associated intermittent dry cough. Per patient's daughter, his wife noticed dry cough a few days ago but more pronounced this AM. In the ED, initial VS: temp 99.1, HR 95, RR 16, /67, sats 95% on RA. Work up notable for CXR with L tension pneumothorax. Labs fairly unremarkable. EKG NSR, trop and BNP negative. L sided chest tube was placed by the ED provider, CT surgery Dr. Knight consulted for chest tube management. Subsequent CXR showed near resolution of L PTX. Hospital medicine consulted for admission.     Pt reports he is very active at baseline. Denies any recent illness, trauma, falls, straining or injuries. He is not a smoker, denies any prior hx of COPD/asthma/emphysema. Reports CP and dyspnea improved since placement of chest tube.       * No surgery found *      Hospital Course:   Pt admitted to hospital medicine. CT surgery   Antonia following for chest tube management. CT chest done to eval PTX and possible underlying lung pathology was notable for pericardial cyst. Discussed with Dr. Knight- he recommends no surgical intervention for pericardial cyst as pt does not have any symptoms from it and does not have any signs of infection. Chest tube was changed to larger bore chest tube on 10/14 by Dr. Knight. Interval improvement in pneumothorax. Chest tube  placed to waterseal on 10/16/23 and discontinued on 10/17/2023 by CT surgery. Subsequent CXR did not show any pneumothorax and patient was deemed stable for discharge home by CT surgery with close outpatient followup.     Pt seen and examined on day of discharge. Remains hemodynamically stable, oxygen sats stable on room air. He denies CP, SOB, cough. Appears stable for discharge home today per my exam. Followup with PCP in 3-5 days and CT surgery on 10/25/2023. Amb referrals also placed to Pulmonology and cardiology, see below for further details.        Goals of Care Treatment Preferences:  Code Status: Full Code      Consults:     Pulmonary  * Spontaneous pneumothorax  - unclear etiology. Pt is a non-smoker, no COPD/emphysema, no recent trauma/injuries.   - s/p L chest tube placement in the ED  - CT surgery consulted  - Chest tube exchanged to larger bore on 10/14, chest tube placed to waterseal 10/16 and discontinued on 10/17  - subsequent CXR with L base atelectasis, no pneumothorax   - Outpatient followup with CT surgery within 1 week   - Dr. Knight also requests pulmonary followup on discharge; referral sent     Cardiac/Vascular  Coronary artery calcification seen on CT scan  PT without chest tightness at this time, shortness of breath resolved after chest tube placement. Initial trops neg x 2, EKG with no acute ischemic changes  Outpatient referral to cardiology for further management/ischemic work up       Pericardial cyst  - noted incidentally on CT imaging of the chest    -  Cardiothoracic surgery consulted; discussed with Dr. Knight- he recommends no surgical drainage or intervention at this time as pt is not having symptoms from cyst and has no signs of infection   - outpatient followup and further management per cardiology and CT surgery           Final Active Diagnoses:    Diagnosis Date Noted POA    PRINCIPAL PROBLEM:  Spontaneous pneumothorax [J93.83] 10/13/2023 Yes    Pericardial cyst [Q24.8] 10/14/2023 Not Applicable    Coronary artery calcification seen on CT scan [I25.10] 10/14/2023 Yes    Hyperlipidemia [E78.5] 07/09/2019 Yes    Essential hypertension, benign [I10] 06/14/2018 Yes    Pre-diabetes [R73.03] 06/29/2018 Yes     Chronic      Problems Resolved During this Admission:       Discharged Condition: good    Disposition: Home or Self Care    Follow Up:   Follow-up Information     Chung Knight MD. Schedule an appointment as soon as possible for a visit on 10/25/2023.    Specialty: Cardiothoracic Surgery  Contact information:  [address] [place] [address]  [phone]             Maria Ines Bingham MD. Schedule an appointment as soon as possible for a visit in 3 day(s).    Specialty: Internal Medicine  Contact information:  [address]  [place] [address]  [phone]             Xuan Loving PA-C. Schedule an appointment as soon as possible for a visit in 1 week(s).    Specialty: Pulmonary Disease  Why: Pulmonary referral sent  Contact information:  [address]  [place] [address]  [phone]             Yaritza Stephenson MD Follow up in 2 week(s).    Specialties: Interventional Cardiology, Cardiology  Why: followup coronary calcifications and pericardial cyst seen on CT, referral sent  Contact information:  [address]  [place] [address]  [phone]                       Patient Instructions:      Ambulatory referral/consult to Cardiology   Standing Status: Future   Referral Priority: Routine  Referral Type: Consultation   Referral Reason: Specialty Services Required   Requested Specialty: Cardiology   Number of Visits Requested: 1     Ambulatory referral/consult to Pulmonology   Standing Status: Future   Referral Priority: Routine Referral Type: Consultation   Referral Reason: Specialty Services Required   Requested Specialty: Pulmonary Disease   Number of Visits Requested: 1     Diet diabetic     Lifting restrictions   Order Comments: No lifting greater than 5 lbs, no straining     Notify your health care provider if you experience any of the following:  redness, tenderness, or signs of infection (pain, swelling, redness, odor or green/yellow discharge around incision site)     Notify your health care provider if you experience any of the following:  difficulty breathing or increased cough       Significant Diagnostic Studies:See Hospital Course     Pending Diagnostic Studies:     None         Medications:  Reconciled Home Medications:      Medication List      CONTINUE taking these medications    atorvastatin 10 MG tablet  Commonly known as: LIPITOR  Take 1 tablet by mouth once daily     clotrimazole-betamethasone 1-0.05% cream  Commonly known as: LOTRISONE  APPLY DAB TO AFFECTED AREA DAILY AS NEEDED     JANUMET -1,000 mg Tm24  Generic drug: SITagliptan-metformin  Take 1 tablet by mouth once daily     meloxicam 15 MG tablet  Commonly known as: MOBIC  Take 1 tablet (15 mg total) by mouth once daily.     olmesartan 40 MG tablet  Commonly known as: BENICAR  Take 1 tablet by mouth once daily     tadalafiL 20 MG Tab  Commonly known as: CIALIS  TAKE 1 TABLET BY MOUTH EVERY OTHER DAY AS NEEDED FOR ERECTILE DYSFUNCTION     traMADoL 50 mg tablet  Commonly known as: ULTRAM  Take 50 mg by mouth every 4 to 6 hours as needed for Pain.            Indwelling Lines/Drains at time of discharge:   Lines/Drains/Airways     None                 Time spent on the discharge of patient: 44 minutes         Mariola DUPREE  MD Paulette  Department of Hospital Medicine  '[facility] - Telemetry ([facility])

## 2023-10-18 NOTE — PLAN OF CARE
Problem: Adult Inpatient Plan of Care  Goal: Plan of Care Review  Outcome: Ongoing, Progressing     Problem: Gas Exchange Impaired  Goal: Optimal Gas Exchange  Outcome: Ongoing, Progressing     Problem: Respiratory Compromise (Pneumothorax)  Goal: Optimal Oxygenation and Ventilation  Outcome: Ongoing, Progressing     Problem: Impaired Wound Healing  Goal: Optimal Wound Healing  Outcome: Ongoing, Progressing

## 2023-10-18 NOTE — ASSESSMENT & PLAN NOTE
PT without chest tightness at this time, shortness of breath resolved after chest tube placement. Initial trops neg x 2, EKG with no acute ischemic changes  Outpatient referral to cardiology for further management/ischemic work up

## 2023-10-18 NOTE — PLAN OF CARE
[facility] (Hospital)  Discharge Final Note    Primary Care Provider: Maria Ines Bingham MD    Expected Discharge Date: 10/18/2023    Final Discharge Note (most recent)       Final Note - 10/18/23 0948          Final Note    Assessment Type Final Discharge Note     Anticipated Discharge Disposition Home or Self Care        Post-Acute Status    Coverage Medicare A & B     Discharge Delays None known at this time                     Important Message from Medicare  Important Message from Medicare regarding Discharge Appeal Rights: Given to patient/caregiver, Explained to patient/caregiver, Signed/date by patient/caregiver     Date IMM was signed: 10/18/23  Time IMM was signed: 0934    Contact Info       Chung Knight MD   Specialty: Cardiothoracic Surgery    [address] [place] [address] [address] [place] [address]   Phone: [phone]       Next Steps: Schedule an appointment as soon as possible for a visit on 10/25/2023    Maria Ines Bingham MD   Specialty: Internal Medicine   Relationship: PCP - General    [address] [facility] [address]   Phone: [phone]       Next Steps: Schedule an appointment as soon as possible for a visit in 3 day(s)    Xuan Loving PA-C   Specialty: Pulmonary Disease    [address]  [facility] [address]   Phone: [phone]       Next Steps: Schedule an appointment as soon as possible for a visit in 1 week(s)    Instructions: Pulmonary referral sent    Yaritza Stephenson MD   Specialty: Interventional Cardiology, Cardiology    [address]  [place] [address]   Phone: [phone]       Next Steps: Follow up in 2 week(s)    Instructions: followup coronary calcifications and pericardial cyst seen on CT, referral sent          DC Disposition: home with family  Family Notified: Patient and spouse at bedside  Transportation: personal transportation    Patient had no equipment or placement needs from .     Patient has resources to schedule hospital  follow up with non-[facility] PCP on [facility].

## 2023-10-18 NOTE — PLAN OF CARE
Patient discharged with personal belongings. Discharge education and medications reviewed with patient. LDA and telemetry box removed per MD order. No discharge medications ordered.    Problem: Adult Inpatient Plan of Care  Goal: Plan of Care Review  Outcome: Met  Goal: Patient-Specific Goal (Individualized)  Outcome: Met  Goal: Absence of Hospital-Acquired Illness or Injury  Outcome: Met  Goal: Optimal Comfort and Wellbeing  Outcome: Met  Goal: Readiness for Transition of Care  Outcome: Met     Problem: Gas Exchange Impaired  Goal: Optimal Gas Exchange  Outcome: Met     Problem: Respiratory Compromise (Pneumothorax)  Goal: Optimal Oxygenation and Ventilation  Outcome: Met     Problem: Impaired Wound Healing  Goal: Optimal Wound Healing  Outcome: Met

## 2023-10-18 NOTE — ASSESSMENT & PLAN NOTE
- unclear etiology. Pt is a non-smoker, no COPD/emphysema, no recent trauma/injuries.   - s/p L chest tube placement in the ED  - CT surgery consulted  - Chest tube exchanged to larger bore on 10/14, chest tube placed to waterseal 10/16 and discontinued on 10/17  - subsequent CXR with L base atelectasis, no pneumothorax   - Outpatient followup with CT surgery within 1 week   - Dr. Knight also requests pulmonary followup on discharge; referral sent

## 2023-10-18 NOTE — PLAN OF CARE
10/18/23 0947   Medicare Message   Important Message from Medicare regarding Discharge Appeal Rights Given to patient/caregiver;Explained to patient/caregiver;Signed/date by patient/caregiver   Date IMM was signed 10/18/23   Time IMM was signed 0991

## 2023-10-18 NOTE — ASSESSMENT & PLAN NOTE
- noted incidentally on CT imaging of the chest    - Cardiothoracic surgery consulted; discussed with Dr. Knight- he recommends no surgical drainage or intervention at this time as pt is not having symptoms from cyst and has no signs of infection   - outpatient followup and further management per cardiology and CT surgery

## 2023-10-19 ENCOUNTER — TELEPHONE (OUTPATIENT)
Dept: CARDIOLOGY | Facility: CLINIC | Age: 68
End: 2023-10-19
Payer: MEDICARE

## 2023-10-19 DIAGNOSIS — J93.83 SPONTANEOUS PNEUMOTHORAX: Primary | ICD-10-CM

## 2023-10-19 NOTE — TELEPHONE ENCOUNTER
Spoke to patient and informed him that he will see Dr. Mclaughlin now and once discharged from him he will see a general cardiologist----- Message from Jennifer Sarkar sent at 10/19/2023  1:38 PM CDT -----  Contact: DWAYNE SANTIAGO [[id number]]  ..Type:  Patient Requesting Call    Who Called: DWAYNE SANTIAGO [[id number]]  Does the patient know what this is regarding?: scheduling   Would the patient rather a call back or a response via [facility]?  call  Best Call Back Number: .[phone] (home)   Additional Information: Hospital f/u, discharged 10/18

## 2023-10-20 ENCOUNTER — PATIENT OUTREACH (OUTPATIENT)
Dept: ADMINISTRATIVE | Facility: CLINIC | Age: 68
End: 2023-10-20
Payer: MEDICARE

## 2023-10-24 ENCOUNTER — OFFICE VISIT (OUTPATIENT)
Dept: PULMONOLOGY | Facility: CLINIC | Age: 68
End: 2023-10-24
Payer: MEDICARE

## 2023-10-24 VITALS
HEART RATE: 75 BPM | OXYGEN SATURATION: 98 % | HEIGHT: 70 IN | DIASTOLIC BLOOD PRESSURE: 68 MMHG | BODY MASS INDEX: 30.11 KG/M2 | SYSTOLIC BLOOD PRESSURE: 130 MMHG | WEIGHT: 210.31 LBS | RESPIRATION RATE: 12 BRPM

## 2023-10-24 DIAGNOSIS — J93.83 SPONTANEOUS PNEUMOTHORAX: ICD-10-CM

## 2023-10-24 DIAGNOSIS — Q24.8 PERICARDIAL CYST: ICD-10-CM

## 2023-10-24 PROCEDURE — 99203 PR OFFICE/OUTPT VISIT, NEW, LEVL III, 30-44 MIN: ICD-10-PCS | Mod: S$PBB,,, | Performed by: HOSPITALIST

## 2023-10-24 PROCEDURE — 99999 PR PBB SHADOW E&M-EST. PATIENT-LVL IV: CPT | Mod: PBBFAC,,, | Performed by: HOSPITALIST

## 2023-10-24 PROCEDURE — 99999 PR PBB SHADOW E&M-EST. PATIENT-LVL IV: ICD-10-PCS | Mod: PBBFAC,,, | Performed by: HOSPITALIST

## 2023-10-24 PROCEDURE — 99203 OFFICE O/P NEW LOW 30 MIN: CPT | Mod: S$PBB,,, | Performed by: HOSPITALIST

## 2023-10-24 PROCEDURE — 99214 OFFICE O/P EST MOD 30 MIN: CPT | Mod: PBBFAC | Performed by: HOSPITALIST

## 2023-10-24 NOTE — PROGRESS NOTES
Subjective:      Patient ID: Brayan Mills is a 68 y.o. male.    Chief Complaint: Spontaneous Pneumo    HPI 10/24/23:    68 year old male with history of HTN, HLD who was referred to Pulmonary clinic by Mariola Caldwell MD for hospital follow up. Mr. Mills presented to the ED 10/13/23 with acute onset shortness of breath and chest pain. CXR revealed left pneumothorax. Chest tube placed in the ED. CT Chest with incidental pericardial cyst and residual pneumo. Large bore chest tube placed 10/14/23 by Dr. Knight. The chest tube was able to be weaned and discontinued 10/17/23. CXR 10/18 with stability.     Smoking hx: Never smoker, never vaper  Environmental/Occupational hx: No known exposures    Review of Systems   Respiratory:  Negative for cough, sputum production, wheezing and dyspnea on extertion.    Cardiovascular:  Negative for chest pain.     Objective:     Physical Exam   Constitutional: He is oriented to person, place, and time. He appears well-developed and well-nourished. He is not obese.   Cardiovascular: Normal rate and regular rhythm.   Pulmonary/Chest: Normal expansion, effort normal and breath sounds normal.   Musculoskeletal:         General: No edema.   Neurological: He is alert and oriented to person, place, and time.   Skin: Skin is warm and dry.   Chest tube site c/d/i     Personal Diagnostic Review  As Above      10/18/2023     7:10 AM 10/18/2023     3:59 AM 10/18/2023    12:31 AM 10/17/2023    11:00 PM 10/17/2023     9:00 PM 10/17/2023     7:34 PM 10/17/2023     5:25 PM   Pulmonary Function Tests   SpO2 94 % 95 % 98 % 97 % 99 % 95 % 96 %   Weight   96.1 kg (211 lb 13.8 oz)       BMI (Calculated)   30.4            Assessment:     No diagnosis found.     Outpatient Encounter Medications as of 10/24/2023   Medication Sig Dispense Refill    atorvastatin (LIPITOR) 10 MG tablet Take 1 tablet by mouth once daily 90 tablet 0    clotrimazole-betamethasone 1-0.05% (LOTRISONE) cream APPLY DAB TO  AFFECTED AREA DAILY AS NEEDED      JANUMET -1,000 mg TM24 Take 1 tablet by mouth once daily 90 tablet 0    meloxicam (MOBIC) 15 MG tablet Take 1 tablet (15 mg total) by mouth once daily. 30 tablet 1    olmesartan (BENICAR) 40 MG tablet Take 1 tablet by mouth once daily 90 tablet 0    tadalafiL (CIALIS) 20 MG Tab TAKE 1 TABLET BY MOUTH EVERY OTHER DAY AS NEEDED FOR ERECTILE DYSFUNCTION      traMADoL (ULTRAM) 50 mg tablet Take 50 mg by mouth every 4 to 6 hours as needed for Pain.       No facility-administered encounter medications on file as of 10/24/2023.           Plan:     Problem List Items Addressed This Visit          Pulmonary    Spontaneous pneumothorax     - discussed with pt and his spouse  - there is one right apical bleb, possible that he had similar on the left that could have predisposed him for spontaneous pneumo  - no further intervention indicated  - recovering well, walked 3 miles this morning  - chest tube site c/d/i            Cardiac/Vascular    Pericardial cyst     - incidental finding, asmptomatic          Plan discussed with pt and his spouse, all questions answered. They are following up with CT surgery tomorrow. Thank you for the opportunity to participate in Mr. Mills's care. Follow up PRN.

## 2023-10-24 NOTE — ASSESSMENT & PLAN NOTE
- discussed with pt and his spouse  - there is one right apical bleb, possible that he had similar on the left that could have predisposed him for spontaneous pneumo  - no further intervention indicated  - recovering well, walked 3 miles this morning  - chest tube site c/d/i

## 2023-10-25 ENCOUNTER — HOSPITAL ENCOUNTER (OUTPATIENT)
Dept: RADIOLOGY | Facility: HOSPITAL | Age: 68
Discharge: HOME | End: 2023-10-25
Attending: THORACIC SURGERY (CARDIOTHORACIC VASCULAR SURGERY)
Payer: MEDICARE

## 2023-10-25 ENCOUNTER — OFFICE VISIT (OUTPATIENT)
Dept: CARDIOTHORACIC SURGERY | Facility: CLINIC | Age: 68
End: 2023-10-25
Payer: MEDICARE

## 2023-10-25 VITALS
DIASTOLIC BLOOD PRESSURE: 66 MMHG | WEIGHT: 210.56 LBS | HEART RATE: 68 BPM | SYSTOLIC BLOOD PRESSURE: 124 MMHG | HEIGHT: 70 IN | OXYGEN SATURATION: 98 % | BODY MASS INDEX: 30.14 KG/M2

## 2023-10-25 DIAGNOSIS — J93.83 SPONTANEOUS PNEUMOTHORAX: Primary | ICD-10-CM

## 2023-10-25 DIAGNOSIS — J93.83 SPONTANEOUS PNEUMOTHORAX: ICD-10-CM

## 2023-10-25 PROCEDURE — 99999 PR PBB SHADOW E&M-EST. PATIENT-LVL III: CPT | Mod: PBBFAC,,, | Performed by: THORACIC SURGERY (CARDIOTHORACIC VASCULAR SURGERY)

## 2023-10-25 PROCEDURE — 71046 X-RAY EXAM CHEST 2 VIEWS: CPT | Mod: 26,,, | Performed by: RADIOLOGY

## 2023-10-25 PROCEDURE — 99999 PR PBB SHADOW E&M-EST. PATIENT-LVL III: ICD-10-PCS | Mod: PBBFAC,,, | Performed by: THORACIC SURGERY (CARDIOTHORACIC VASCULAR SURGERY)

## 2023-10-25 PROCEDURE — 71046 X-RAY EXAM CHEST 2 VIEWS: CPT | Mod: TC

## 2023-10-25 PROCEDURE — 99213 OFFICE O/P EST LOW 20 MIN: CPT | Mod: PBBFAC,25 | Performed by: THORACIC SURGERY (CARDIOTHORACIC VASCULAR SURGERY)

## 2023-10-25 PROCEDURE — 71046 XR CHEST PA AND LATERAL: ICD-10-PCS | Mod: 26,,, | Performed by: RADIOLOGY

## 2023-10-25 PROCEDURE — 99024 POSTOP FOLLOW-UP VISIT: CPT | Mod: POP,,, | Performed by: THORACIC SURGERY (CARDIOTHORACIC VASCULAR SURGERY)

## 2023-10-25 PROCEDURE — 99024 PR POST-OP FOLLOW-UP VISIT: ICD-10-PCS | Mod: POP,,, | Performed by: THORACIC SURGERY (CARDIOTHORACIC VASCULAR SURGERY)

## 2023-10-25 NOTE — PROGRESS NOTES
"Subjective:      Patient ID: Brayan Mills is a 68 y.o. male.    Chief Complaint: No chief complaint on file.    HPI:   The patient is a [age]-year-old male with hypertension, hyperlipidemia who was managed for spontaneous pneumothorax on 10/13/2023.  The patient has no complaints.  Denies any further chest in or shortness of breath.  Chest x-ray from today shows no residual pneumothorax.  Review of patient's allergies indicates:  No Known Allergies         Objective:   /66   Pulse 68   Ht 5' 10" (1.778 m)   Wt 95.5 kg (210 lb 8.6 oz)   SpO2 98%   BMI 30.21 kg/m²     X-Ray Chest PA And Lateral  Narrative: EXAM:  XR CHEST PA AND LATERAL    CLINICAL HISTORY: Pneumothorax    COMPARISON: 10/18/2023    FINDINGS: No confluent airspace opacity or consolidation.  There is no evidence of pleural effusion, pneumothorax, or other acute pulmonary disease.  The cardiomediastinal silhouette is within normal limits.  No acute osseous abnormality is evident.  Impression:  No pneumothorax identified    Finalized on: 10/25/2023 10:17 AM By:  Gonsalo Ruiz MD  [facility]# [id number]      2023-10-25 10:19:[id number]    [facility]         Physical Exam  Constitutional:       Appearance: Normal appearance.   HENT:      Head: Normocephalic and atraumatic.   Cardiovascular:      Rate and Rhythm: Normal rate and regular rhythm.      Heart sounds: Normal heart sounds.   Pulmonary:      Breath sounds: Normal breath sounds.   Abdominal:      General: Abdomen is flat. Bowel sounds are normal.      Palpations: Abdomen is soft.   Musculoskeletal:      Right lower leg: No edema.      Left lower leg: No edema.   Skin:     General: Skin is warm and dry.   Neurological:      Mental Status: He is alert and oriented to person, place, and time.         Assessment:     Spontaneous pneumothorax    Plan     The patient is status post chest tube for spontaneous pneumothorax.  Chest x-ray shows no recurrent or residual pneumothorax.  Chest tube site is healing " well.  Chest tube sites suture removed.  Patient is being followed by pulmonology.  Patient will be discharged from Cardiothoracic surgery Clinic.        Mark Awolesi, [facility]

## 2023-11-08 ENCOUNTER — OFFICE VISIT (OUTPATIENT)
Dept: CARDIOLOGY | Facility: CLINIC | Age: 68
End: 2023-11-08
Payer: MEDICARE

## 2023-11-08 VITALS
DIASTOLIC BLOOD PRESSURE: 70 MMHG | WEIGHT: 211.63 LBS | OXYGEN SATURATION: 97 % | HEART RATE: 83 BPM | SYSTOLIC BLOOD PRESSURE: 122 MMHG | BODY MASS INDEX: 30.37 KG/M2

## 2023-11-08 DIAGNOSIS — I10 ESSENTIAL HYPERTENSION, BENIGN: ICD-10-CM

## 2023-11-08 DIAGNOSIS — E78.2 MIXED HYPERLIPIDEMIA: Primary | ICD-10-CM

## 2023-11-08 DIAGNOSIS — R07.9 CHEST PAIN: ICD-10-CM

## 2023-11-08 DIAGNOSIS — J93.83 SPONTANEOUS PNEUMOTHORAX: ICD-10-CM

## 2023-11-08 DIAGNOSIS — Q24.8 PERICARDIAL CYST: ICD-10-CM

## 2023-11-08 DIAGNOSIS — Z13.6 ENCOUNTER FOR SCREENING FOR CARDIOVASCULAR DISORDERS: ICD-10-CM

## 2023-11-08 DIAGNOSIS — I70.0 AORTIC ATHEROSCLEROSIS: ICD-10-CM

## 2023-11-08 DIAGNOSIS — I25.10 CORONARY ARTERY CALCIFICATION SEEN ON CT SCAN: ICD-10-CM

## 2023-11-08 PROCEDURE — 99204 OFFICE O/P NEW MOD 45 MIN: CPT | Mod: S$PBB,,, | Performed by: STUDENT IN AN ORGANIZED HEALTH CARE EDUCATION/TRAINING PROGRAM

## 2023-11-08 PROCEDURE — 99999 PR PBB SHADOW E&M-EST. PATIENT-LVL III: CPT | Mod: PBBFAC,,, | Performed by: STUDENT IN AN ORGANIZED HEALTH CARE EDUCATION/TRAINING PROGRAM

## 2023-11-08 PROCEDURE — 99204 PR OFFICE/OUTPT VISIT, NEW, LEVL IV, 45-59 MIN: ICD-10-PCS | Mod: S$PBB,,, | Performed by: STUDENT IN AN ORGANIZED HEALTH CARE EDUCATION/TRAINING PROGRAM

## 2023-11-08 PROCEDURE — 99999 PR PBB SHADOW E&M-EST. PATIENT-LVL III: ICD-10-PCS | Mod: PBBFAC,,, | Performed by: STUDENT IN AN ORGANIZED HEALTH CARE EDUCATION/TRAINING PROGRAM

## 2023-11-08 PROCEDURE — 99213 OFFICE O/P EST LOW 20 MIN: CPT | Mod: PBBFAC | Performed by: STUDENT IN AN ORGANIZED HEALTH CARE EDUCATION/TRAINING PROGRAM

## 2023-11-08 NOTE — PROGRESS NOTES
Section of Cardiology                  Cardiac Clinic Note    Chief Complaint/Reason for consultation: establish care       HPI:   Brayan Mills is a 68 y.o. male with h/o hypertension, hyperlipidemia who was managed for spontaneous pneumothorax on 10/13/2023 s/p chest tube, pericardial cyst who was referred to cardiology clinic by Dr. Caldwell for evaluation.     11/8/23  Comes in with wife  CT scan 10/13/23 coronary artery calcification, fluid filled sac in pneumothorax   No heart issues as a child  Went into hospital with chest pain/SOB, found to have spontaneous pneumothorax   No symptoms since discharge     Walks daily, 5-6 days a week, 2-3 miles  Has been losing weight   Tries to eat a healthy diet ]  BP stable  Weight stable in 210s lbs     Denies chest pain, SOB, LE swelling, PND, orthopnea  Denies tobacco or ETOH abuse     Family hx: mom- ?heart disease, PVD      EKG 10/13/23 NSR, no acute ST - T wave changes    ECHO  No results found for this or any previous visit.       STRESS TEST No results found for this or any previous visit.       LHC No results found for this or any previous visit.            ROS: All 10 systems reviewed. Please refer to the HPI for pertinent positives. All other systems negative.     Past Medical History  Past Medical History:   Diagnosis Date    Hyperlipidemia     Hypertension     Malignant tumor of prostate        Surgical History  Past Surgical History:   Procedure Laterality Date    PROSTATECTOMY            Allergies:   Review of patient's allergies indicates:  No Known Allergies    Social History:  Social History     Socioeconomic History    Marital status: Single   Tobacco Use    Smoking status: Never    Smokeless tobacco: Never   Substance and Sexual Activity    Alcohol use: No    Drug use: No    Sexual activity: Not Currently       Family History:  family history includes Hypertension in his mother.    Home Medications:  Current Outpatient Medications on  File Prior to Visit   Medication Sig Dispense Refill    atorvastatin (LIPITOR) 10 MG tablet Take 1 tablet by mouth once daily 90 tablet 0    clotrimazole-betamethasone 1-0.05% (LOTRISONE) cream APPLY DAB TO AFFECTED AREA DAILY AS NEEDED      JANUMET -1,000 mg TM24 Take 1 tablet by mouth once daily 90 tablet 0    olmesartan (BENICAR) 40 MG tablet Take 1 tablet by mouth once daily 90 tablet 0    tadalafiL (CIALIS) 20 MG Tab TAKE 1 TABLET BY MOUTH EVERY OTHER DAY AS NEEDED FOR ERECTILE DYSFUNCTION      traMADoL (ULTRAM) 50 mg tablet Take 50 mg by mouth every 4 to 6 hours as needed for Pain.      meloxicam (MOBIC) 15 MG tablet Take 1 tablet (15 mg total) by mouth once daily. (Patient not taking: Reported on 11/8/2023) 30 tablet 1     No current facility-administered medications on file prior to visit.       Physical exam:  /70 (BP Location: Right arm, Patient Position: Sitting)   Pulse 83   Wt 96 kg (211 lb 10.3 oz)   SpO2 97%   BMI 30.37 kg/m²         General: Pt is a 68 y.o. year old male who is AAOx3, in NAD, is pleasant, well nourished, looks stated age  HEENT: PERRL, EOMI, Oral mucosa pink & moist  CVS  No abnormal cardiac pulsations noted on inspection. JVP not raised. The apical impulse is normal on palpation, and is located in the left 5th intercostal space in the mid - clavicular line. No palpable thrills or abnormal pulsations noted. RR, S1 - S2 heard, no murmurs, rubs or gallops appreciated.   PUL : CTA B/L. No wheezes/crackles heard   ABD : BS +, soft. No tenderness elicited   LE : No C/C/E. Distal Pulses palpable B/L         LABS:    Chemistry:   Lab Results   Component Value Date     10/17/2023    K 4.4 10/17/2023     10/17/2023    CO2 23 10/17/2023    BUN 19 10/17/2023    CREATININE 0.9 10/17/2023    CALCIUM 8.6 (L) 10/17/2023     Cardiac Markers:   Lab Results   Component Value Date    TROPONINI <0.006 10/13/2023     Cardiac Markers (Last 3):   Lab Results   Component Value  "Date    TROPONINI <0.006 10/13/2023    TROPONINI 0.019 10/13/2023     CBC:   Lab Results   Component Value Date    WBC 6.30 10/17/2023    HGB 13.2 (L) 10/17/2023    HCT 39.1 (L) 10/17/2023    MCV 95 10/17/2023     10/17/2023     Lipids:   Lab Results   Component Value Date    CHOL 157 04/25/2023    TRIG 65 04/25/2023    HDL 44 04/25/2023     Coagulation: No results found for: "PT", "INR", "APTT"        Assessment        1. Mixed hyperlipidemia    2. Chest pain    3. Coronary artery calcification seen on CT scan    4. Pericardial cyst    5. Essential hypertension, benign    6. Spontaneous pneumothorax    7. Aortic atherosclerosis         Plan:    Coronary calcification  CT scan with coronary calcification  Will obtain coronary calcium score  Obtain echo   Continue statin  Start ASA     Spontaneous pneumothorax  Resolved  Seen CT surgery   Fluid filled area within pneumothorax     HTN  Stable  Continue benicar    HLD   as of 4/23  Continue statin     Obesity, Body mass index is 30.37 kg/m².   Low salt, low fat diet  Exercise as tolerated, at least 30 min daily     This note was prepared using voice recognition system and is likely to have sound alike errors that may have been overlooked even after proofreading.     I have reviewed all pertinent chart information.  Plans and recommendations have been formulated under my direct supervision. All questions answered and patient voiced understanding.   If symptoms persist go to the ED.    RTC in 3 months         Wil Marshall MD  Cardiology          "

## 2023-11-14 ENCOUNTER — LAB VISIT (OUTPATIENT)
Dept: LAB | Facility: HOSPITAL | Age: 68
End: 2023-11-14
Attending: STUDENT IN AN ORGANIZED HEALTH CARE EDUCATION/TRAINING PROGRAM
Payer: MEDICARE

## 2023-11-14 DIAGNOSIS — E78.2 MIXED HYPERLIPIDEMIA: ICD-10-CM

## 2023-11-14 LAB
CHOLEST SERPL-MCNC: 137 MG/DL (ref 120–199)
CHOLEST/HDLC SERPL: 3.5 {RATIO} (ref 2–5)
HDLC SERPL-MCNC: 39 MG/DL (ref 40–75)
HDLC SERPL: 28.5 % (ref 20–50)
LDLC SERPL CALC-MCNC: 86.4 MG/DL (ref 63–159)
NONHDLC SERPL-MCNC: 98 MG/DL
TRIGL SERPL-MCNC: 58 MG/DL (ref 30–150)

## 2023-11-14 PROCEDURE — 80061 LIPID PANEL: CPT | Performed by: STUDENT IN AN ORGANIZED HEALTH CARE EDUCATION/TRAINING PROGRAM

## 2023-11-14 PROCEDURE — 36415 COLL VENOUS BLD VENIPUNCTURE: CPT | Performed by: STUDENT IN AN ORGANIZED HEALTH CARE EDUCATION/TRAINING PROGRAM

## 2023-11-15 ENCOUNTER — TELEPHONE (OUTPATIENT)
Dept: CARDIOLOGY | Facility: CLINIC | Age: 68
End: 2023-11-15

## 2023-11-15 RX ORDER — ATORVASTATIN CALCIUM 20 MG/1
20 TABLET, FILM COATED ORAL DAILY
Qty: 30 TABLET | Refills: 6 | Status: SHIPPED | OUTPATIENT
Start: 2023-11-15 | End: 2023-12-12 | Stop reason: SDUPTHER

## 2023-11-15 NOTE — TELEPHONE ENCOUNTER
Please review pt lab results.    ----- Message from Nayely Ribeiro MA sent at 11/15/2023 11:13 AM CST -----  Name of Who is Calling:DWAYNE SANTIAGO [[id number]]                What is the request in detail: Pt is requesting a call back to get his cholesterol lab results. Please assist.                Can the clinic reply by [facility]: No                What Number to Call Back if not [facility]: [phone]

## 2023-11-16 ENCOUNTER — PATIENT MESSAGE (OUTPATIENT)
Dept: CARDIOLOGY | Facility: CLINIC | Age: 68
End: 2023-11-16
Payer: MEDICARE

## 2023-12-04 ENCOUNTER — HOSPITAL ENCOUNTER (OUTPATIENT)
Dept: RADIOLOGY | Facility: HOSPITAL | Age: 68
Discharge: HOME | End: 2023-12-04
Attending: STUDENT IN AN ORGANIZED HEALTH CARE EDUCATION/TRAINING PROGRAM
Payer: MEDICARE

## 2023-12-04 ENCOUNTER — HOSPITAL ENCOUNTER (OUTPATIENT)
Dept: CARDIOLOGY | Facility: HOSPITAL | Age: 68
Discharge: HOME | End: 2023-12-04
Attending: STUDENT IN AN ORGANIZED HEALTH CARE EDUCATION/TRAINING PROGRAM
Payer: MEDICARE

## 2023-12-04 VITALS
SYSTOLIC BLOOD PRESSURE: 122 MMHG | DIASTOLIC BLOOD PRESSURE: 70 MMHG | HEIGHT: 70 IN | BODY MASS INDEX: 30.21 KG/M2 | WEIGHT: 211 LBS

## 2023-12-04 DIAGNOSIS — I10 ESSENTIAL HYPERTENSION, BENIGN: ICD-10-CM

## 2023-12-04 DIAGNOSIS — J93.83 SPONTANEOUS PNEUMOTHORAX: ICD-10-CM

## 2023-12-04 DIAGNOSIS — I25.10 CORONARY ARTERY CALCIFICATION SEEN ON CT SCAN: ICD-10-CM

## 2023-12-04 DIAGNOSIS — Z13.6 ENCOUNTER FOR SCREENING FOR CARDIOVASCULAR DISORDERS: ICD-10-CM

## 2023-12-04 DIAGNOSIS — R07.9 CHEST PAIN: ICD-10-CM

## 2023-12-04 DIAGNOSIS — Q24.8 PERICARDIAL CYST: ICD-10-CM

## 2023-12-04 DIAGNOSIS — E78.2 MIXED HYPERLIPIDEMIA: ICD-10-CM

## 2023-12-04 DIAGNOSIS — I70.0 AORTIC ATHEROSCLEROSIS: ICD-10-CM

## 2023-12-04 LAB
AORTIC ROOT ANNULUS: 3.04 CM
ASCENDING AORTA: 3.39 CM
AV INDEX (PROSTH): 0.5
AV MEAN GRADIENT: 6 MMHG
AV PEAK GRADIENT: 10 MMHG
AV VALVE AREA BY VELOCITY RATIO: 1.54 CM²
AV VALVE AREA: 1.4 CM²
AV VELOCITY RATIO: 0.55
BSA FOR ECHO PROCEDURE: 2.17 M2
CV ECHO LV RWT: 0.68 CM
DOP CALC AO PEAK VEL: 1.55 M/S
DOP CALC AO VTI: 32 CM
DOP CALC LVOT AREA: 2.8 CM2
DOP CALC LVOT DIAMETER: 1.89 CM
DOP CALC LVOT PEAK VEL: 0.85 M/S
DOP CALC LVOT STROKE VOLUME: 44.87 CM3
DOP CALC RVOT PEAK VEL: 0.73 M/S
DOP CALC RVOT VTI: 15.5 CM
DOP CALCLVOT PEAK VEL VTI: 16 CM
E WAVE DECELERATION TIME: 188.59 MSEC
E/A RATIO: 1.02
E/E' RATIO: 5.65 M/S
ECHO LV POSTERIOR WALL: 1.06 CM (ref 0.6–1.1)
FRACTIONAL SHORTENING: 34 % (ref 28–44)
INTERVENTRICULAR SEPTUM: 1.09 CM (ref 0.6–1.1)
IVC DIAMETER: 1.33 CM
IVRT: 79.92 MSEC
LA MAJOR: 4.43 CM
LA MINOR: 4.21 CM
LA WIDTH: 3.9 CM
LEFT ATRIUM SIZE: 2.94 CM
LEFT ATRIUM VOLUME INDEX: 19.7 ML/M2
LEFT ATRIUM VOLUME: 42.08 CM3
LEFT INTERNAL DIMENSION IN SYSTOLE: 2.07 CM (ref 2.1–4)
LEFT VENTRICLE DIASTOLIC VOLUME INDEX: 18.06 ML/M2
LEFT VENTRICLE DIASTOLIC VOLUME: 38.64 ML
LEFT VENTRICLE MASS INDEX: 45 G/M2
LEFT VENTRICLE SYSTOLIC VOLUME INDEX: 6.5 ML/M2
LEFT VENTRICLE SYSTOLIC VOLUME: 13.85 ML
LEFT VENTRICULAR INTERNAL DIMENSION IN DIASTOLE: 3.12 CM (ref 3.5–6)
LEFT VENTRICULAR MASS: 97.11 G
LV LATERAL E/E' RATIO: 5.42 M/S
LV SEPTAL E/E' RATIO: 5.91 M/S
LVOT MG: 1.4 MMHG
LVOT MV: 0.55 CM/S
MV PEAK A VEL: 0.64 M/S
MV PEAK E VEL: 0.65 M/S
PISA MRMAX VEL: 3.29 M/S
PV MEAN GRADIENT: 1 MMHG
RA MAJOR: 3.94 CM
RA PRESSURE ESTIMATED: 3 MMHG
RA WIDTH: 3.53 CM
RIGHT VENTRICULAR END-DIASTOLIC DIMENSION: 3.73 CM
SINUS: 3.7 CM
STJ: 3.29 CM
TDI LATERAL: 0.12 M/S
TDI SEPTAL: 0.11 M/S
TDI: 0.12 M/S
TRICUSPID ANNULAR PLANE SYSTOLIC EXCURSION: 2.51 CM
Z-SCORE OF LEFT VENTRICULAR DIMENSION IN END DIASTOLE: -7.91
Z-SCORE OF LEFT VENTRICULAR DIMENSION IN END SYSTOLE: -5.59

## 2023-12-04 PROCEDURE — 93306 TTE W/DOPPLER COMPLETE: CPT

## 2023-12-04 PROCEDURE — 75571 CT HRT W/O DYE W/CA TEST: CPT | Mod: TC

## 2023-12-04 PROCEDURE — 93306 TTE W/DOPPLER COMPLETE: CPT | Mod: 26,,, | Performed by: INTERNAL MEDICINE

## 2023-12-04 PROCEDURE — 75571 CT CALCIUM SCORING CARDIAC: ICD-10-PCS | Mod: 26,,, | Performed by: RADIOLOGY

## 2023-12-04 PROCEDURE — 75571 CT HRT W/O DYE W/CA TEST: CPT | Mod: 26,,, | Performed by: RADIOLOGY

## 2023-12-04 PROCEDURE — 93306 ECHO (CUPID ONLY): ICD-10-PCS | Mod: 26,,, | Performed by: INTERNAL MEDICINE

## 2023-12-08 NOTE — PROGRESS NOTES
Call patient   Coronary calcium score is high at 278 which shows a risk for coronary moderate to high  Please make in-person or virtual visit appointment with me in the next 1-2 weeks for further discussion about his results.

## 2023-12-12 ENCOUNTER — PATIENT MESSAGE (OUTPATIENT)
Dept: CARDIOLOGY | Facility: CLINIC | Age: 68
End: 2023-12-12

## 2023-12-12 ENCOUNTER — OFFICE VISIT (OUTPATIENT)
Dept: CARDIOLOGY | Facility: CLINIC | Age: 68
End: 2023-12-12
Payer: MEDICARE

## 2023-12-12 DIAGNOSIS — R07.9 CHEST PAIN, UNSPECIFIED TYPE: ICD-10-CM

## 2023-12-12 DIAGNOSIS — I10 ESSENTIAL HYPERTENSION, BENIGN: ICD-10-CM

## 2023-12-12 DIAGNOSIS — J93.83 SPONTANEOUS PNEUMOTHORAX: ICD-10-CM

## 2023-12-12 DIAGNOSIS — Q24.8 PERICARDIAL CYST: ICD-10-CM

## 2023-12-12 DIAGNOSIS — R93.1 HIGH CORONARY ARTERY CALCIUM SCORE: Primary | ICD-10-CM

## 2023-12-12 DIAGNOSIS — J93.9 PNEUMOTHORAX ON LEFT: ICD-10-CM

## 2023-12-12 DIAGNOSIS — I25.10 CORONARY ARTERY CALCIFICATION SEEN ON CT SCAN: ICD-10-CM

## 2023-12-12 DIAGNOSIS — I70.0 AORTIC ATHEROSCLEROSIS: ICD-10-CM

## 2023-12-12 DIAGNOSIS — E78.2 MIXED HYPERLIPIDEMIA: ICD-10-CM

## 2023-12-12 PROCEDURE — 99214 PR OFFICE/OUTPT VISIT, EST, LEVL IV, 30-39 MIN: ICD-10-PCS | Mod: 95,,, | Performed by: STUDENT IN AN ORGANIZED HEALTH CARE EDUCATION/TRAINING PROGRAM

## 2023-12-12 PROCEDURE — 99214 OFFICE O/P EST MOD 30 MIN: CPT | Mod: 95,,, | Performed by: STUDENT IN AN ORGANIZED HEALTH CARE EDUCATION/TRAINING PROGRAM

## 2023-12-12 RX ORDER — ATORVASTATIN CALCIUM 40 MG/1
40 TABLET, FILM COATED ORAL DAILY
Qty: 90 TABLET | Refills: 1 | Status: SHIPPED | OUTPATIENT
Start: 2023-12-12 | End: 2024-01-09 | Stop reason: SDUPTHER

## 2023-12-12 NOTE — PROGRESS NOTES
Section of Cardiology                  Cardiac Clinic Note    Chief Complaint/Reason for consultation: establish care       The patient location is: home    Visit type: audio only    Face to Face time with patient: 20 min  10 minutes of total time spent on the encounter, which includes face to face time and non-face to face time preparing to see the patient (eg, review of tests), Obtaining and/or reviewing separately obtained history, Documenting clinical information in the electronic or other health record, Independently interpreting results (not separately reported) and communicating results to the patient/family/caregiver, or Care coordination (not separately reported).         Each patient to whom he or she provides medical services by telemedicine is:  (1) informed of the relationship between the physician and patient and the respective role of any other health care provider with respect to management of the patient; and (2) notified that he or she may decline to receive medical services by telemedicine and may withdraw from such care at any time.    Notes:      HPI:   Brayan Mills is a 68 y.o. male with h/o hypertension, hyperlipidemia who was managed for spontaneous pneumothorax on 10/13/2023 s/p chest tube, pericardial cyst who was referred to cardiology clinic by Dr. Caldwell for evaluation.     11/8/23  Comes in with wife  CT scan 10/13/23 coronary artery calcification, fluid filled sac in pneumothorax   No heart issues as a child  Went into hospital with chest pain/SOB, found to have spontaneous pneumothorax   No symptoms since discharge     Walks daily, 5-6 days a week, 2-3 miles  Has been losing weight   Tries to eat a healthy diet   BP stable  Weight stable in 210s lbs     Denies chest pain, SOB, LE swelling, PND, orthopnea  Denies tobacco or ETOH abuse     Family hx: mom- ?heart disease, PVD        12/12/23  Virtual visit  CT calcium score high at 278  Denies any symptoms  Echo 12/23  with normal EF       EKG 10/13/23 NSR, no acute ST - T wave changes    Results for orders placed during the hospital encounter of 12/04/23    Echo    Interpretation Summary    Left Ventricle: The left ventricle is normal in size. Normal wall thickness. There is concentric remodeling. Normal wall motion. There is normal systolic function with a visually estimated ejection fraction of 60 - 65%. There is normal diastolic function.    Right Ventricle: Normal right ventricular cavity size. Wall thickness is normal. Right ventricle wall motion  is normal. Systolic function is normal.    Tricuspid Valve: There is mild regurgitation.    IVC/SVC: Normal venous pressure at 3 mmHg.         STRESS TEST No results found for this or any previous visit.       LHC No results found for this or any previous visit.            ROS: All 10 systems reviewed. Please refer to the HPI for pertinent positives. All other systems negative.     Past Medical History  Past Medical History:   Diagnosis Date    Hyperlipidemia     Hypertension     Malignant tumor of prostate        Surgical History  Past Surgical History:   Procedure Laterality Date    PROSTATECTOMY            Allergies:   Review of patient's allergies indicates:  No Known Allergies    Social History:  Social History     Socioeconomic History    Marital status: Single   Tobacco Use    Smoking status: Never    Smokeless tobacco: Never   Substance and Sexual Activity    Alcohol use: No    Drug use: No    Sexual activity: Not Currently     Social Determinants of Health     Financial Resource Strain: Low Risk  (12/12/2023)    Overall Financial Resource Strain (CARDIA)     Difficulty of Paying Living Expenses: Not hard at all   Food Insecurity: No Food Insecurity (12/12/2023)    Hunger Vital Sign     Worried About Running Out of Food in the Last Year: Never true     Ran Out of Food in the Last Year: Never true   Transportation Needs: No Transportation Needs (12/12/2023)    PRAPARE -  Transportation     Lack of Transportation (Medical): No     Lack of Transportation (Non-Medical): No   Physical Activity: Unknown (12/12/2023)    Exercise Vital Sign     Days of Exercise per Week: 5 days   Stress: Stress Concern Present (12/12/2023)    [place] [employer] - Occupational Stress Questionnaire     Feeling of Stress : Rather much   Social Connections: Unknown (12/12/2023)    Social Connection and Isolation Panel [NHANES]     Frequency of Communication with Friends and Family: Three times a week     Frequency of Social Gatherings with Friends and Family: Patient refused     Active Member of Clubs or Organizations: Yes     Attends Club or Organization Meetings: 1 to 4 times per year     Marital Status:    Housing Stability: Low Risk  (12/12/2023)    Housing Stability Vital Sign     Unable to Pay for Housing in the Last Year: No     Number of Places Lived in the Last Year: 1     Unstable Housing in the Last Year: No       Family History:  family history includes Hypertension in his mother.    Home Medications:  Current Outpatient Medications on File Prior to Visit   Medication Sig Dispense Refill    atorvastatin (LIPITOR) 20 MG tablet Take 1 tablet (20 mg total) by mouth once daily. 30 tablet 6    clotrimazole-betamethasone 1-0.05% (LOTRISONE) cream APPLY DAB TO AFFECTED AREA DAILY AS NEEDED      JANUMET -1,000 mg TM24 Take 1 tablet by mouth once daily 90 tablet 0    meloxicam (MOBIC) 15 MG tablet Take 1 tablet (15 mg total) by mouth once daily. (Patient not taking: Reported on 11/8/2023) 30 tablet 1    olmesartan (BENICAR) 40 MG tablet Take 1 tablet by mouth once daily 90 tablet 0    tadalafiL (CIALIS) 20 MG Tab TAKE 1 TABLET BY MOUTH EVERY OTHER DAY AS NEEDED FOR ERECTILE DYSFUNCTION      traMADoL (ULTRAM) 50 mg tablet Take 50 mg by mouth every 4 to 6 hours as needed for Pain.       No current facility-administered medications on file prior to visit.       Physical  "exam:  There were no vitals taken for this visit.        General: Pt is a 68 y.o. year old male who is AAOx3, in NAD, is pleasant, well nourished, looks stated age          LABS:    Chemistry:   Lab Results   Component Value Date     10/17/2023    K 4.4 10/17/2023     10/17/2023    CO2 23 10/17/2023    BUN 19 10/17/2023    CREATININE 0.9 10/17/2023    CALCIUM 8.6 (L) 10/17/2023     Cardiac Markers:   Lab Results   Component Value Date    TROPONINI <0.006 10/13/2023     Cardiac Markers (Last 3):   Lab Results   Component Value Date    TROPONINI <0.006 10/13/2023    TROPONINI 0.019 10/13/2023     CBC:   Lab Results   Component Value Date    WBC 6.30 10/17/2023    HGB 13.2 (L) 10/17/2023    HCT 39.1 (L) 10/17/2023    MCV 95 10/17/2023     10/17/2023     Lipids:   Lab Results   Component Value Date    CHOL 137 11/14/2023    TRIG 58 11/14/2023    HDL 39 (L) 11/14/2023     Coagulation: No results found for: "PT", "INR", "APTT"        Assessment        1. Chest pain, unspecified type    2. Coronary artery calcification seen on CT scan    3. Pericardial cyst    4. Spontaneous pneumothorax    5. Essential hypertension, benign    6. Mixed hyperlipidemia    7. Aortic atherosclerosis    8. Pneumothorax on left           Plan:    Coronary calcification  CT scan with coronary calcification  CT calcium score 278  Will obtain nuclear stress test   Echo 12/23 normal EF   Continue statin, ASA     Spontaneous pneumothorax  Resolved  Has seen CT surgery   Fluid filled area within pneumothorax     HTN  Stable  Continue benicar    HLD   as of 4/23  Continue statin     Obesity  Low salt, low fat diet  Exercise as tolerated, at least 30 min daily     This note was prepared using voice recognition system and is likely to have sound alike errors that may have been overlooked even after proofreading.     I have reviewed all pertinent chart information.  Plans and recommendations have been formulated under my direct " supervision. All questions answered and patient voiced understanding.   If symptoms persist go to the ED.    RTC in 3 months         Wil Marshall MD  Cardiology

## 2023-12-13 ENCOUNTER — TELEPHONE (OUTPATIENT)
Dept: CARDIOLOGY | Facility: HOSPITAL | Age: 68
End: 2023-12-13
Payer: MEDICARE

## 2023-12-14 ENCOUNTER — TELEPHONE (OUTPATIENT)
Dept: CARDIOLOGY | Facility: HOSPITAL | Age: 68
End: 2023-12-14
Payer: MEDICARE

## 2024-01-11 ENCOUNTER — HOSPITAL ENCOUNTER (OUTPATIENT)
Dept: RADIOLOGY | Facility: HOSPITAL | Age: 69
Discharge: HOME | End: 2024-01-11
Attending: STUDENT IN AN ORGANIZED HEALTH CARE EDUCATION/TRAINING PROGRAM
Payer: MEDICARE

## 2024-01-11 ENCOUNTER — HOSPITAL ENCOUNTER (OUTPATIENT)
Dept: CARDIOLOGY | Facility: HOSPITAL | Age: 69
Discharge: HOME | End: 2024-01-11
Attending: STUDENT IN AN ORGANIZED HEALTH CARE EDUCATION/TRAINING PROGRAM
Payer: MEDICARE

## 2024-01-11 DIAGNOSIS — J93.9 PNEUMOTHORAX ON LEFT: ICD-10-CM

## 2024-01-11 DIAGNOSIS — R93.1 HIGH CORONARY ARTERY CALCIUM SCORE: ICD-10-CM

## 2024-01-11 DIAGNOSIS — I10 ESSENTIAL HYPERTENSION, BENIGN: ICD-10-CM

## 2024-01-11 DIAGNOSIS — I25.10 CORONARY ARTERY CALCIFICATION SEEN ON CT SCAN: ICD-10-CM

## 2024-01-11 DIAGNOSIS — I70.0 AORTIC ATHEROSCLEROSIS: ICD-10-CM

## 2024-01-11 DIAGNOSIS — R07.9 CHEST PAIN, UNSPECIFIED TYPE: ICD-10-CM

## 2024-01-11 DIAGNOSIS — Q24.8 PERICARDIAL CYST: ICD-10-CM

## 2024-01-11 DIAGNOSIS — J93.83 SPONTANEOUS PNEUMOTHORAX: ICD-10-CM

## 2024-01-11 DIAGNOSIS — E78.2 MIXED HYPERLIPIDEMIA: ICD-10-CM

## 2024-01-11 LAB
CV STRESS BASE HR: 77 BPM
DIASTOLIC BLOOD PRESSURE: 67 MMHG
NUC REST EJECTION FRACTION: 76
NUC STRESS EJECTION FRACTION: 71 %
OHS CV CPX 85 PERCENT MAX PREDICTED HEART RATE MALE: 129
OHS CV CPX ESTIMATED METS: 8
OHS CV CPX MAX PREDICTED HEART RATE: 152
OHS CV CPX PATIENT IS FEMALE: 0
OHS CV CPX PATIENT IS MALE: 1
OHS CV CPX PEAK DIASTOLIC BLOOD PRESSURE: 64 MMHG
OHS CV CPX PEAK HEAR RATE: 136 BPM
OHS CV CPX PEAK RATE PRESSURE PRODUCT: NORMAL
OHS CV CPX PEAK SYSTOLIC BLOOD PRESSURE: 173 MMHG
OHS CV CPX PERCENT MAX PREDICTED HEART RATE ACHIEVED: 89
OHS CV CPX RATE PRESSURE PRODUCT PRESENTING: NORMAL
STRESS ECHO POST EXERCISE DUR MIN: 6 MINUTES
STRESS ECHO POST EXERCISE DUR SEC: 22 SECONDS
SYSTOLIC BLOOD PRESSURE: 136 MMHG

## 2024-01-11 PROCEDURE — 93017 CV STRESS TEST TRACING ONLY: CPT

## 2024-01-11 PROCEDURE — 93016 CV STRESS TEST SUPVJ ONLY: CPT | Mod: ,,, | Performed by: INTERNAL MEDICINE

## 2024-01-11 PROCEDURE — 78452 HT MUSCLE IMAGE SPECT MULT: CPT | Mod: 26,,, | Performed by: INTERNAL MEDICINE

## 2024-01-11 PROCEDURE — 93018 CV STRESS TEST I&R ONLY: CPT | Mod: ,,, | Performed by: INTERNAL MEDICINE

## 2024-01-11 RX ORDER — REGADENOSON 0.08 MG/ML
0.4 INJECTION, SOLUTION INTRAVENOUS
Status: SHIPPED | OUTPATIENT
Start: 2024-01-11

## 2024-01-17 ENCOUNTER — PATIENT MESSAGE (OUTPATIENT)
Dept: CARDIOLOGY | Facility: CLINIC | Age: 69
End: 2024-01-17
Payer: MEDICARE

## 2024-02-14 ENCOUNTER — OFFICE VISIT (OUTPATIENT)
Dept: CARDIOLOGY | Facility: CLINIC | Age: 69
End: 2024-02-14
Payer: MEDICARE

## 2024-02-14 VITALS
BODY MASS INDEX: 30.58 KG/M2 | SYSTOLIC BLOOD PRESSURE: 136 MMHG | HEIGHT: 70 IN | RESPIRATION RATE: 16 BRPM | DIASTOLIC BLOOD PRESSURE: 70 MMHG | OXYGEN SATURATION: 97 % | WEIGHT: 213.63 LBS | HEART RATE: 89 BPM

## 2024-02-14 DIAGNOSIS — I10 ESSENTIAL HYPERTENSION, BENIGN: ICD-10-CM

## 2024-02-14 DIAGNOSIS — Q24.8 PERICARDIAL CYST: ICD-10-CM

## 2024-02-14 DIAGNOSIS — J93.83 SPONTANEOUS PNEUMOTHORAX: ICD-10-CM

## 2024-02-14 DIAGNOSIS — R93.1 HIGH CORONARY ARTERY CALCIUM SCORE: ICD-10-CM

## 2024-02-14 DIAGNOSIS — I25.10 CORONARY ARTERY CALCIFICATION SEEN ON CT SCAN: Primary | ICD-10-CM

## 2024-02-14 DIAGNOSIS — J93.9 PNEUMOTHORAX ON LEFT: ICD-10-CM

## 2024-02-14 DIAGNOSIS — E78.2 MIXED HYPERLIPIDEMIA: ICD-10-CM

## 2024-02-14 DIAGNOSIS — I70.0 AORTIC ATHEROSCLEROSIS: ICD-10-CM

## 2024-02-14 PROCEDURE — 99214 OFFICE O/P EST MOD 30 MIN: CPT | Mod: S$PBB,,, | Performed by: STUDENT IN AN ORGANIZED HEALTH CARE EDUCATION/TRAINING PROGRAM

## 2024-02-14 PROCEDURE — 99999 PR PBB SHADOW E&M-EST. PATIENT-LVL III: CPT | Mod: PBBFAC,,, | Performed by: STUDENT IN AN ORGANIZED HEALTH CARE EDUCATION/TRAINING PROGRAM

## 2024-02-14 PROCEDURE — 99213 OFFICE O/P EST LOW 20 MIN: CPT | Mod: PBBFAC | Performed by: STUDENT IN AN ORGANIZED HEALTH CARE EDUCATION/TRAINING PROGRAM

## 2024-02-14 NOTE — PROGRESS NOTES
Section of Cardiology                  Cardiac Clinic Note    Chief Complaint/Reason for consultation: establish care       HPI:   Brayan Mills is a 69 y.o. male with h/o hypertension, hyperlipidemia who was managed for spontaneous pneumothorax on 10/13/2023 s/p chest tube, pericardial cyst who was referred to cardiology clinic by Dr. Caldwell for evaluation.     11/8/23  Comes in with wife  CT scan 10/13/23 coronary artery calcification, fluid filled sac in pneumothorax   No heart issues as a child  Went into hospital with chest pain/SOB, found to have spontaneous pneumothorax   No symptoms since discharge     Walks daily, 5-6 days a week, 2-3 miles  Has been losing weight   Tries to eat a healthy diet   BP stable  Weight stable in 210s lbs     Denies chest pain, SOB, LE swelling, PND, orthopnea  Denies tobacco or ETOH abuse     Family hx: mom- ?heart disease, PVD        12/12/23  Virtual visit  CT calcium score high at 278  Denies any symptoms  Echo 12/23 with normal EF   Nuclear stress 1/24 no ischemia       2/14/24  Nuclear stress 1/24 no ischemia   Doing well  Still exercising  Taking ASA and statin  Watching his diet          EKG 10/13/23 NSR, no acute ST - T wave changes    Results for orders placed during the hospital encounter of 12/04/23    Echo    Interpretation Summary    Left Ventricle: The left ventricle is normal in size. Normal wall thickness. There is concentric remodeling. Normal wall motion. There is normal systolic function with a visually estimated ejection fraction of 60 - 65%. There is normal diastolic function.    Right Ventricle: Normal right ventricular cavity size. Wall thickness is normal. Right ventricle wall motion  is normal. Systolic function is normal.    Tricuspid Valve: There is mild regurgitation.    IVC/SVC: Normal venous pressure at 3 mmHg.         STRESS TEST No results found for this or any previous visit.       LHC No results found for this or any previous  visit.            ROS: All 10 systems reviewed. Please refer to the HPI for pertinent positives. All other systems negative.     Past Medical History  Past Medical History:   Diagnosis Date    Hyperlipidemia     Hypertension     Malignant tumor of prostate        Surgical History  Past Surgical History:   Procedure Laterality Date    PROSTATECTOMY            Allergies:   Review of patient's allergies indicates:  No Known Allergies    Social History:  Social History     Socioeconomic History    Marital status: Single   Tobacco Use    Smoking status: Never    Smokeless tobacco: Never   Substance and Sexual Activity    Alcohol use: No    Drug use: No    Sexual activity: Not Currently     Social Determinants of Health     Financial Resource Strain: Low Risk  (12/12/2023)    Overall Financial Resource Strain (CARDIA)     Difficulty of Paying Living Expenses: Not hard at all   Food Insecurity: No Food Insecurity (12/12/2023)    Hunger Vital Sign     Worried About Running Out of Food in the Last Year: Never true     Ran Out of Food in the Last Year: Never true   Transportation Needs: No Transportation Needs (12/12/2023)    PRAPARE - Transportation     Lack of Transportation (Medical): No     Lack of Transportation (Non-Medical): No   Physical Activity: Unknown (12/12/2023)    Exercise Vital Sign     Days of Exercise per Week: 5 days   Stress: Stress Concern Present (12/12/2023)    [place] [employer] - Occupational Stress Questionnaire     Feeling of Stress : Rather much   Social Connections: Unknown (12/12/2023)    Social Connection and Isolation Panel [NHANES]     Frequency of Communication with Friends and Family: Three times a week     Frequency of Social Gatherings with Friends and Family: Patient declined     Active Member of Clubs or Organizations: Yes     Attends Club or Organization Meetings: 1 to 4 times per year     Marital Status:    Housing Stability: Low Risk  (12/12/2023)     "Housing Stability Vital Sign     Unable to Pay for Housing in the Last Year: No     Number of Places Lived in the Last Year: 1     Unstable Housing in the Last Year: No       Family History:  family history includes Hypertension in his mother.    Home Medications:  Current Outpatient Medications on File Prior to Visit   Medication Sig Dispense Refill    atorvastatin (LIPITOR) 40 MG tablet Take 1 tablet (40 mg total) by mouth once daily. 90 tablet 1    clotrimazole-betamethasone 1-0.05% (LOTRISONE) cream APPLY DAB TO AFFECTED AREA DAILY AS NEEDED      JANUMET -1,000 mg TM24 Take 1 tablet by mouth once daily 90 tablet 0    meloxicam (MOBIC) 15 MG tablet Take 1 tablet (15 mg total) by mouth once daily. 30 tablet 1    olmesartan (BENICAR) 40 MG tablet Take 1 tablet by mouth once daily 90 tablet 0    traMADoL (ULTRAM) 50 mg tablet Take 50 mg by mouth every 4 to 6 hours as needed for Pain.       Current Facility-Administered Medications on File Prior to Visit   Medication Dose Route Frequency Provider Last Rate Last Admin    regadenoson injection 0.4 mg  0.4 mg Intravenous 1 time in Clinic/HOD Wil Marshall MD           Physical exam:  /70   Pulse 89   Resp 16   Ht 5' 10" (1.778 m)   Wt 96.9 kg (213 lb 10 oz)   SpO2 97%   BMI 30.65 kg/m²         General: Pt is a 69 y.o. year old male who is AAOx3, in NAD, is pleasant, well nourished, looks stated age  HEENT: PERRL, EOMI, Oral mucosa pink & moist  CVS  No abnormal cardiac pulsations noted on inspection. JVP not raised. The apical impulse is normal on palpation, and is located in the left 5th intercostal space in the mid - clavicular line. No palpable thrills or abnormal pulsations noted. RR, S1 - S2 heard, no murmurs, rubs or gallops appreciated.   PUL : CTA B/L. No wheezes/crackles heard   ABD : BS +, soft. No tenderness elicited   LE : No C/C/E. Distal Pulses palpable B/L         LABS:    Chemistry:   Lab Results   Component Value Date     " "01/09/2024    K 4.4 01/09/2024     01/09/2024    CO2 24 01/09/2024    BUN 12 01/09/2024    CREATININE 0.93 01/09/2024    CALCIUM 9.4 01/09/2024     Cardiac Markers:   Lab Results   Component Value Date    TROPONINI <0.006 10/13/2023     Cardiac Markers (Last 3):   Lab Results   Component Value Date    TROPONINI <0.006 10/13/2023    TROPONINI 0.019 10/13/2023     CBC:   Lab Results   Component Value Date    WBC 6.9 01/09/2024    WBC 6.30 10/17/2023    HGB 15.3 01/09/2024    HGB 13.2 (L) 10/17/2023    HCT 43.9 01/09/2024    HCT 39.1 (L) 10/17/2023    MCV 95 01/09/2024    MCV 95 10/17/2023     01/09/2024     10/17/2023     Lipids:   Lab Results   Component Value Date    CHOL 139 01/09/2024    TRIG 54 01/09/2024    HDL 47 01/09/2024     Coagulation: No results found for: "PT", "INR", "APTT"        Assessment        1. Coronary artery calcification seen on CT scan    2. Aortic atherosclerosis    3. Pericardial cyst    4. Spontaneous pneumothorax    5. Essential hypertension, benign    6. Mixed hyperlipidemia    7. High coronary artery calcium score    8. Pneumothorax on left           Plan:    Coronary calcification  CT scan with coronary calcification  CT calcium score 278  Nuclear stress 1/24 no ischemia   Small pericardial cyst 7.3 cm in left cardiophrenic angle  Echo 12/23 normal EF   Continue statin, ASA     Spontaneous pneumothorax  Resolved  Has seen CT surgery     HTN  Stable  Continue benicar    HLD  LDL 80 as of 1/24  Continue statin     Obesity  Low salt, low fat diet  Exercise as tolerated, at least 30 min daily     This note was prepared using voice recognition system and is likely to have sound alike errors that may have been overlooked even after proofreading.     I have reviewed all pertinent chart information.  Plans and recommendations have been formulated under my direct supervision. All questions answered and patient voiced understanding.   If symptoms persist go to the ED.    RTC " in 1 year         Wil Marshall MD  Cardiology

## 2024-06-05 PROBLEM — C61 MALIGNANT NEOPLASM OF PROSTATE: Status: ACTIVE | Noted: 2024-06-05

## 2025-02-10 ENCOUNTER — TELEPHONE (OUTPATIENT)
Dept: CARDIOLOGY | Facility: CLINIC | Age: 70
End: 2025-02-10
Payer: MEDICARE

## 2025-02-10 NOTE — TELEPHONE ENCOUNTER
Rtn called pt states that he was dr diaz pt and he wants to schedule with next available . Appt made with dr ramirez pb----- Message from Carmen sent at 2/10/2025  1:38 PM CST -----  Contact: self  Patient is requesting a call back regarding scheduling. Please call back at [phone]

## 2025-02-17 PROBLEM — C61 MALIGNANT NEOPLASM OF PROSTATE: Status: RESOLVED | Noted: 2024-06-05 | Resolved: 2025-02-17

## 2025-03-18 DIAGNOSIS — Z00.00 ROUTINE HEALTH MAINTENANCE: ICD-10-CM

## 2025-03-18 DIAGNOSIS — I10 ESSENTIAL HYPERTENSION, BENIGN: Primary | ICD-10-CM

## 2025-03-19 ENCOUNTER — TELEPHONE (OUTPATIENT)
Dept: CARDIOLOGY | Facility: CLINIC | Age: 70
End: 2025-03-19
Payer: MEDICARE

## 2025-03-19 NOTE — TELEPHONE ENCOUNTER
Pt states that he can not make another appt placed with dr morrison. pbr      ----- Message from Jaye sent at 3/19/2025 12:29 PM CDT -----  Type:  Sooner Appointment RequestCaller is requesting a sooner appointment.  Caller declined first available appointment listed below.  Caller will not accept being placed on the waitlist and is requesting a message be sent to doctor.Name of Caller:  pt When is the first available appointment?  May 7 Symptoms:  f/u Would the patient rather a call back or a response via [facility]? Call [facility] Call Back Number:  [phone] (home) Additional Information:  please advise

## 2025-03-21 ENCOUNTER — TELEPHONE (OUTPATIENT)
Dept: CARDIOLOGY | Facility: CLINIC | Age: 70
End: 2025-03-21
Payer: MEDICARE

## 2025-03-21 NOTE — TELEPHONE ENCOUNTER
RAVINDERM to call clinic to review scheduling    ----- Message from Jamaal sent at 3/21/2025  9:49 AM CDT -----  Contact: [phone]  Type:  Sooner Apoointment RequestCaller is requesting a sooner appointment.  Caller declined first available appointment listed below.  Caller will not accept being placed on the waitlist and is requesting a message be sent to doctor.Name of Caller:DWAYNE SANTIAGO [[id number]]When is the first available appointment?next availabilitySymptoms:per pt requestWould the patient rather a call back or a response via [facility]? Call [facility] Call Back Number: [phone] Information: [id number]

## 2025-05-22 ENCOUNTER — HOSPITAL ENCOUNTER (OUTPATIENT)
Dept: CARDIOLOGY | Facility: HOSPITAL | Age: 70
Discharge: HOME | End: 2025-05-22
Attending: INTERNAL MEDICINE
Payer: MEDICARE

## 2025-05-22 ENCOUNTER — OFFICE VISIT (OUTPATIENT)
Dept: CARDIOLOGY | Facility: CLINIC | Age: 70
End: 2025-05-22
Payer: MEDICARE

## 2025-05-22 VITALS
HEART RATE: 67 BPM | BODY MASS INDEX: 29.32 KG/M2 | OXYGEN SATURATION: 97 % | WEIGHT: 204.81 LBS | HEIGHT: 70 IN | DIASTOLIC BLOOD PRESSURE: 82 MMHG | SYSTOLIC BLOOD PRESSURE: 128 MMHG

## 2025-05-22 DIAGNOSIS — Z00.00 ROUTINE HEALTH MAINTENANCE: ICD-10-CM

## 2025-05-22 DIAGNOSIS — I25.10 CORONARY ARTERY CALCIFICATION SEEN ON CT SCAN: Primary | ICD-10-CM

## 2025-05-22 DIAGNOSIS — I10 ESSENTIAL HYPERTENSION, BENIGN: ICD-10-CM

## 2025-05-22 DIAGNOSIS — I70.0 AORTIC ATHEROSCLEROSIS: ICD-10-CM

## 2025-05-22 DIAGNOSIS — R73.03 PRE-DIABETES: Chronic | ICD-10-CM

## 2025-05-22 DIAGNOSIS — E78.2 MIXED HYPERLIPIDEMIA: ICD-10-CM

## 2025-05-22 LAB
OHS QRS DURATION: 86 MS
OHS QTC CALCULATION: 393 MS

## 2025-05-22 PROCEDURE — 99999 PR PBB SHADOW E&M-EST. PATIENT-LVL III: CPT | Mod: PBBFAC,,, | Performed by: INTERNAL MEDICINE

## 2025-05-22 PROCEDURE — 99213 OFFICE O/P EST LOW 20 MIN: CPT | Mod: S$PBB,,, | Performed by: INTERNAL MEDICINE

## 2025-05-22 PROCEDURE — 93010 ELECTROCARDIOGRAM REPORT: CPT | Mod: ,,, | Performed by: INTERNAL MEDICINE

## 2025-05-22 PROCEDURE — 99213 OFFICE O/P EST LOW 20 MIN: CPT | Mod: PBBFAC,25 | Performed by: INTERNAL MEDICINE

## 2025-05-22 PROCEDURE — 93005 ELECTROCARDIOGRAM TRACING: CPT

## 2025-05-22 NOTE — PROGRESS NOTES
Subjective:   Patient ID:  Brayan Mills is a 70 y.o. male who presents for follow up of No chief complaint on file.      HPI  Brayan Mills is a 69 y.o. male with h/o hypertension, hyperlipidemia who was managed for spontaneous pneumothorax on 10/13/2023 s/p chest tube, pericardial cyst who was referred to cardiology clinic by Dr. Caldwell for evaluation.      11/8/23  Comes in with wife  CT scan 10/13/23 coronary artery calcification, fluid filled sac in pneumothorax   No heart issues as a child  Went into hospital with chest pain/SOB, found to have spontaneous pneumothorax   No symptoms since discharge      Walks daily, 5-6 days a week, 2-3 miles  Has been losing weight   Tries to eat a healthy diet   BP stable  Weight stable in 210s lbs      Denies chest pain, SOB, LE swelling, PND, orthopnea  Denies tobacco or ETOH abuse      Family hx: mom- ?heart disease, PVD           12/12/23  Virtual visit  CT calcium score high at 278  Denies any symptoms  Echo 12/23 with normal EF   Nuclear stress 1/24 no ischemia         2/14/24  Nuclear stress 1/24 no ischemia   Doing well  Still exercising  Taking ASA and statin  Watching his diet              EKG 10/13/23 NSR, no acute ST - T wave changes     Results for orders placed during the hospital encounter of 12/04/23     Echo     Interpretation Summary    Left Ventricle: The left ventricle is normal in size. Normal wall thickness. There is concentric remodeling. Normal wall motion. There is normal systolic function with a visually estimated ejection fraction of 60 - 65%. There is normal diastolic function.    Right Ventricle: Normal right ventricular cavity size. Wall thickness is normal. Right ventricle wall motion  is normal. Systolic function is normal.    Tricuspid Valve: There is mild regurgitation.    IVC/SVC: Normal venous pressure at 3 mmHg.           STRESS TEST No results found for this or any previous visit.        LHC No results found for this or any previous  visit.           5/22/2025   History of Present Illness    Mr. Mills presents today for follow-up of elevated calcium score He denies chest pain, dyspnea, or any history of cardiac problems. A1C has increased from 5.5 in 2019 to 5.8 in February, and most recently 6.0 in 2024. LDL cholesterol is 81. He exercises 4-5 days per week, including a 3-mile walk today. He reports weight loss and is making efforts to maintain healthy dietary habits. He takes ASA 81 mg daily.      ROS:  General: -fever, -chills, -fatigue, -weight gain, -weight loss  Eyes: -vision changes, -redness, -discharge  ENT: -ear pain, -nasal congestion, -sore throat  Cardiovascular: -chest pain, -palpitations, -lower extremity edema  Respiratory: -cough, -shortness of breath  Gastrointestinal: -abdominal pain, -nausea, -vomiting, -diarrhea, -constipation, -blood in stool  Genitourinary: -dysuria, -hematuria, -frequency  Musculoskeletal: -joint pain, -muscle pain  Skin: -rash, -lesion  Neurological: -headache, -dizziness, -numbness, -tingling  Psychiatric: -anxiety, -depression, -sleep difficulty              Past Medical History:   Diagnosis Date    Hyperlipidemia     Hypertension     Malignant tumor of prostate        Past Surgical History:   Procedure Laterality Date    PROSTATECTOMY         Social History[1]    Family History   Problem Relation Name Age of Onset    Hypertension Mother         Current Outpatient Medications   Medication Sig    aspirin 81 MG Chew Take 1 tablet (81 mg total) by mouth once daily.    atorvastatin (LIPITOR) 40 MG tablet Take 1 tablet (40 mg total) by mouth every evening.    cholecalciferol, vitamin D3, 1,250 mcg (50,000 unit) capsule Take 1 capsule (50,000 Units total) by mouth every 7 days.    clotrimazole-betamethasone 1-0.05% (LOTRISONE) cream APPLY DAB TO AFFECTED AREA DAILY AS NEEDED    hydrocortisone 2.5 % cream Apply topically 2 (two) times daily.    olmesartan (BENICAR) 40 MG tablet Take 1 tablet (40 mg total) by  "mouth once daily.    SITagliptan-metformin (JANUMET XR) 100-1,000 mg TM24 Take 1 tablet by mouth once daily.    tadalafiL (CIALIS) 20 MG Tab TAKE 1 TABLET BY MOUTH EVERY OTHER DAY AS NEEDED FOR ERECTILE DYSFUNCTION    bicalutamide (CASODEX) 50 MG Tab Take 50 mg by mouth.    meloxicam (MOBIC) 15 MG tablet Take 1 tablet (15 mg total) by mouth once daily.    traMADoL (ULTRAM) 50 mg tablet Take 50 mg by mouth every 4 to 6 hours as needed for Pain.     No current facility-administered medications for this visit.     Current Outpatient Medications on File Prior to Visit   Medication Sig    aspirin 81 MG Chew Take 1 tablet (81 mg total) by mouth once daily.    atorvastatin (LIPITOR) 40 MG tablet Take 1 tablet (40 mg total) by mouth every evening.    cholecalciferol, vitamin D3, 1,250 mcg (50,000 unit) capsule Take 1 capsule (50,000 Units total) by mouth every 7 days.    clotrimazole-betamethasone 1-0.05% (LOTRISONE) cream APPLY DAB TO AFFECTED AREA DAILY AS NEEDED    hydrocortisone 2.5 % cream Apply topically 2 (two) times daily.    olmesartan (BENICAR) 40 MG tablet Take 1 tablet (40 mg total) by mouth once daily.    SITagliptan-metformin (JANUMET XR) 100-1,000 mg TM24 Take 1 tablet by mouth once daily.    tadalafiL (CIALIS) 20 MG Tab TAKE 1 TABLET BY MOUTH EVERY OTHER DAY AS NEEDED FOR ERECTILE DYSFUNCTION    bicalutamide (CASODEX) 50 MG Tab Take 50 mg by mouth.    meloxicam (MOBIC) 15 MG tablet Take 1 tablet (15 mg total) by mouth once daily.    traMADoL (ULTRAM) 50 mg tablet Take 50 mg by mouth every 4 to 6 hours as needed for Pain.     No current facility-administered medications on file prior to visit.     Review of patient's allergies indicates:  No Known Allergies         Objective:     Vitals:    05/22/25 1300 05/22/25 1303   BP: 126/78 128/82   BP Location: Left arm Right arm   Patient Position: Sitting Sitting   Pulse:  67   SpO2:  97%   Weight:  92.9 kg (204 lb 12.9 oz)   Height:  5' 10" (1.778 m)     Body mass " index is 29.39 kg/m².    Physical Exam  Vitals and nursing note reviewed.   Constitutional:       General: He is not in acute distress.     Appearance: He is well-developed. He is not diaphoretic.   HENT:      Head: Normocephalic and atraumatic.   Eyes:      General:         Right eye: No discharge.         Left eye: No discharge.      Pupils: Pupils are equal, round, and reactive to light.   Neck:      Thyroid: No thyromegaly.      Vascular: No JVD.   Cardiovascular:      Rate and Rhythm: Normal rate and regular rhythm.      Pulses: Normal pulses and intact distal pulses.      Heart sounds: Normal heart sounds. No murmur heard.     No friction rub. No gallop.   Pulmonary:      Effort: Pulmonary effort is normal. No respiratory distress.      Breath sounds: Normal breath sounds. No wheezing or rales.   Chest:      Chest wall: No tenderness.   Abdominal:      General: Bowel sounds are normal. There is no distension.      Palpations: Abdomen is soft.      Tenderness: There is no abdominal tenderness.   Musculoskeletal:         General: Normal range of motion.      Cervical back: Neck supple.      Right lower leg: No edema.      Left lower leg: Edema present.   Skin:     General: Skin is warm and dry.      Findings: No erythema or rash.   Neurological:      Mental Status: He is alert and oriented to person, place, and time.      Cranial Nerves: No cranial nerve deficit.   Psychiatric:         Behavior: Behavior normal.       Lab Results   Component Value Date    CHOL 154 02/17/2025    CHOL 135 09/03/2024    CHOL 134 05/21/2024     Lab Results   Component Value Date    LABA1C 5.5 12/02/2019    HGBA1C 5.8 (H) 02/17/2025      BMP  Lab Results   Component Value Date     02/17/2025    K 4.4 02/17/2025     02/17/2025    CO2 25 02/17/2025    BUN 10 02/17/2025    CREATININE 0.89 02/17/2025    CALCIUM 9.8 02/17/2025    ANIONGAP 10 10/17/2023    EGFRNORACEVR 92 02/17/2025      Lab Results   Component Value Date     "HDL 57 02/17/2025    HDL 52 09/03/2024    HDL 49 05/21/2024     Lab Results   Component Value Date    LDLCALC 81 02/17/2025    LDLCALC 72 09/03/2024     Lab Results   Component Value Date    TRIG 85 02/17/2025    TRIG 47 09/03/2024    TRIG 61 05/21/2024     Lab Results   Component Value Date    CHOLHDL 28.5 11/14/2023       Chemistry        Component Value Date/Time     02/17/2025 0739    K 4.4 02/17/2025 0739     02/17/2025 0739    CO2 25 02/17/2025 0739    BUN 10 02/17/2025 0739    CREATININE 0.89 02/17/2025 0739    GLU 92 02/17/2025 0739        Component Value Date/Time    CALCIUM 9.8 02/17/2025 0739    ALKPHOS 65 10/13/2023 1409    AST 26 02/17/2025 0739    ALT 15 02/17/2025 0739    BILITOT 0.5 02/17/2025 0739    EGFRNONAA 76 12/17/2021 0808          Lab Results   Component Value Date    TSH 1.610 02/17/2025     No results found for: "INR", "PROTIME"  Lab Results   Component Value Date    WBC 6.5 02/17/2025    HGB 13.4 02/17/2025    HCT 40.0 02/17/2025    MCV 96 02/17/2025     02/17/2025     BMP  Sodium   Date Value Ref Range Status   02/17/2025 140 134 - 144 mmol/L Final     Potassium   Date Value Ref Range Status   02/17/2025 4.4 3.5 - 5.2 mmol/L Final     Chloride   Date Value Ref Range Status   02/17/2025 102 96 - 106 mmol/L Final     CO2   Date Value Ref Range Status   02/17/2025 25 20 - 29 mmol/L Final     BUN   Date Value Ref Range Status   02/17/2025 10 8 - 27 mg/dL Final     Creatinine   Date Value Ref Range Status   02/17/2025 0.89 0.76 - 1.27 mg/dL Final     Calcium   Date Value Ref Range Status   02/17/2025 9.8 8.6 - 10.2 mg/dL Final     Anion Gap   Date Value Ref Range Status   10/17/2023 10 8 - 16 mmol/L Final     eGFR if non    Date Value Ref Range Status   12/17/2021 76 >59 mL/min/1.73 Final     CrCl cannot be calculated (Patient's most recent lab result is older than the maximum 7 days allowed.).    Interpretation Summary  Show Result Comparison     Normal " myocardial perfusion scan. There is no evidence of myocardial ischemia or infarction.    There is a  trivial intensity fixed perfusion abnormality in the inferior wall of the left ventricle secondary to diaphragm attenuation.    The gated perfusion images showed an ejection fraction of 76% at rest. The gated perfusion images showed an ejection fraction of 71% post stress.    The ECG portion of the study is negative for ischemia.    The patient reported no chest pain during the stress test.    The exercise capacity was normal.    The patient exercised for 6 minutes 22 seconds on a Dmitriy protocol, corresponding to a functional capacity of 8 METS, achieving a peak heart rate of 136 bpm, which is 89 % of the age predicted maximum heart rate. Their exercise capacity was normal.  Assessment:     1. Coronary artery calcification seen on CT scan    2. Pre-diabetes    3. Essential hypertension, benign    4. Mixed hyperlipidemia    5. Aortic atherosclerosis        Plan:     Assessment & Plan    R93.89 Abnormal findings on diagnostic imaging of other specified body structures  R73.03 Prediabetes  Z79.82 Long term (current) use of aspirin    IMPRESSION:  Calcium score elevated, indicating cardiovascular risk.  A1C trend: current 5.8, previously 5.5 in 2019, 6.0 in 2024.  LDL cholesterol at 81, target <70.  Calcium deposits in heart vessels present, despite negative stress test results.    PLAN SUMMARY:  - Continue aspirin 81 mg daily for cardiovascular protection  - Maintain current exercise routine  - Reduce carbohydrate, starch, and sugar intake  - Practice portion control and limit desserts  - Follow up in July for A1C test results from primary care physician    ABNORMAL FINDINGS ON DIAGNOSTIC IMAGING OF HEART VESSELS:  - Explained significance of calcium deposits in heart vessels and their relation to potential cardiac issues, despite absence of active symptoms.    LONG TERM USE OF ASPIRIN:  - Continued aspirin 81 mg daily  for cardiovascular protection and preventing heart attacks and blood clots.    PREDIABETES MANAGEMENT:  - Continue regular exercise routine (currently 3 miles, 4-5 days a week).  - Maintain weight loss efforts.  - Improve dietary habits: reduce sugar intake, limit dessert consumption, practice portion control.  - Monitor and reduce carbohydrate and starch intake.  - Watch overall calorie consumption.    FOLLOW-UP:  - Follow up in July for A1C test results from primary care physician.        Hlp get ldl to target less than 70 by adjusting diet if fail will get statins adjusted.      This note was generated with the assistance of ambient listening technology. Verbal consent was obtained by the patient and accompanying visitor(s) for the recording of patient appointment to facilitate this note. I attest to having reviewed and edited the generated note for accuracy, though some syntax or spelling errors may persist. Please contact the author of this note for any clarification.            [1]   Social History  Tobacco Use    Smoking status: Never    Smokeless tobacco: Never   Substance Use Topics    Alcohol use: No    Drug use: No